# Patient Record
Sex: MALE | Race: WHITE | NOT HISPANIC OR LATINO | Employment: FULL TIME | URBAN - METROPOLITAN AREA
[De-identification: names, ages, dates, MRNs, and addresses within clinical notes are randomized per-mention and may not be internally consistent; named-entity substitution may affect disease eponyms.]

---

## 2020-08-06 ENCOUNTER — TELEPHONE (OUTPATIENT)
Dept: SCHEDULING | Facility: CLINIC | Age: 70
End: 2020-08-06

## 2020-08-06 NOTE — TELEPHONE ENCOUNTER
Patient requesting NP apt with Dr. Orozco at the [facility] location referred by PCP for tingling R hand SOB, and constant fatigue x a few months.    Requesting [facility] location  No recent cardio  No recent hosp visits  No avail apts  Pt can be reached at [phone]

## 2020-08-13 ENCOUNTER — OFFICE VISIT (OUTPATIENT)
Dept: CARDIOLOGY | Facility: CLINIC | Age: 70
End: 2020-08-13
Payer: COMMERCIAL

## 2020-08-13 VITALS
DIASTOLIC BLOOD PRESSURE: 80 MMHG | SYSTOLIC BLOOD PRESSURE: 140 MMHG | WEIGHT: 179 LBS | HEIGHT: 70 IN | BODY MASS INDEX: 25.62 KG/M2 | HEART RATE: 68 BPM | RESPIRATION RATE: 14 BRPM | TEMPERATURE: 98.6 F

## 2020-08-13 DIAGNOSIS — R06.09 DYSPNEA ON EXERTION: ICD-10-CM

## 2020-08-13 DIAGNOSIS — E78.5 DYSLIPIDEMIA: Primary | ICD-10-CM

## 2020-08-13 PROCEDURE — 99204 OFFICE O/P NEW MOD 45 MIN: CPT | Performed by: INTERNAL MEDICINE

## 2020-08-13 PROCEDURE — 93000 ELECTROCARDIOGRAM COMPLETE: CPT | Performed by: INTERNAL MEDICINE

## 2020-08-13 RX ORDER — ATORVASTATIN CALCIUM 20 MG/1
40 TABLET, FILM COATED ORAL
COMMUNITY
Start: 2020-06-03 | End: 2024-12-12 | Stop reason: SDUPTHER

## 2020-08-13 RX ORDER — TAMSULOSIN HYDROCHLORIDE 0.4 MG/1
0.4 CAPSULE ORAL
COMMUNITY
Start: 2020-06-03 | End: 2024-12-12

## 2020-08-13 RX ORDER — FLUTICASONE PROPIONATE AND SALMETEROL 500; 50 UG/1; UG/1
2 POWDER RESPIRATORY (INHALATION) DAILY
COMMUNITY
Start: 2020-07-15

## 2020-08-13 RX ORDER — CYCLOSPORINE 0.5 MG/ML
1 EMULSION OPHTHALMIC 2 TIMES DAILY
COMMUNITY
End: 2023-11-30

## 2020-08-13 ASSESSMENT — ENCOUNTER SYMPTOMS
EYES NEGATIVE: 1
NEUROLOGICAL NEGATIVE: 1
ROS SKIN COMMENTS: ECZEMA
ENDOCRINE NEGATIVE: 1
RESPIRATORY NEGATIVE: 1
HEMATOLOGIC/LYMPHATIC NEGATIVE: 1
PSYCHIATRIC NEGATIVE: 1
MYALGIAS: 1
DYSPNEA ON EXERTION: 1
MUSCLE CRAMPS: 1
BACK PAIN: 1
GASTROINTESTINAL NEGATIVE: 1
WEIGHT GAIN: 1

## 2020-08-13 NOTE — PROGRESS NOTES
Chief Complaint: I saw Dieter as a new patient today he is here to be evaluated for progressive shortness of breath with mild activity.  He has no chest discomfort or chest pressure but is short of breath with exertion, not anxiety, cold weather, postprandially, he denies chest discomfort or shortness of breath with sex at rest or nocturnally.  He cannot climb a flight of stairs without getting short of breath, he denies proximal nocturnal dyspnea, orthopnea, palpitations, syncope, ankle edema he has nocturia.       Medications:  Current Outpatient Medications   Medication Sig Dispense Refill   • atorvastatin (LIPITOR) 20 mg tablet Take 20 mg by mouth once daily.     • cycloSPORINE (RESTASIS) 0.05 % ophthalmic emulsion Administer 1 drop into both eyes 2 (two) times a day.     • tamsulosin (FLOMAX) 0.4 mg capsule Take 0.4 mg by mouth once daily.     • WIXELA INHUB 500-50 mcg/dose diskus inhaler Inhale 2 puffs daily.       No current facility-administered medications for this visit.    Past medical history: Hypercholesterolemia, asthma, dry eyes, obstructing prostatism, eczema,.  Past surgical history umbilical hernia repair.  Social history he smokes marijuana may be 2 to 5 cigarettes a day for the last 10 years alcohol to 3 beers a week drugs are none.  Family history: Mother  of complications of Alzheimer's disease, father  diabetes and a myocardial infarction.  Dietary history: He does not eat beef daily, fried foods daily, does eat grade 3 eggs per week, does not eat cheese daily but drinks whole milk only in his coffee.  He drinks 1 to 2 cups of coffee day no tea no Coke or Pepsi   allergies: are to latex penicillin nuts.  Injuries: None  Review of Systems:  Review of Systems   Constitution: Positive for weight gain.   HENT: Negative.    Eyes: Negative.         Dry eyes   Cardiovascular: Positive for dyspnea on exertion.   Respiratory: Negative.    Endocrine: Negative.    Hematologic/Lymphatic:  Negative.    Skin: Negative.         eczema   Musculoskeletal: Positive for back pain, muscle cramps and myalgias.   Gastrointestinal: Negative.    Genitourinary: Positive for nocturia.   Neurological: Negative.    Psychiatric/Behavioral: Negative.    Allergic/Immunologic: Positive for environmental allergies.       Physical Exam:  Vitals:    08/13/20 1507   BP: 140/80   Pulse: 68   Resp: 14   Temp: 37 °C (98.6 °F)     Physical Exam   Constitutional: He is oriented to person, place, and time. He appears well-developed and well-nourished.   HENT:   Head: Normocephalic and atraumatic.   Eyes: Pupils are equal, round, and reactive to light. Conjunctivae and EOM are normal.   Neck: Normal range of motion. Neck supple.   Cardiovascular: Normal rate, regular rhythm, normal heart sounds and intact distal pulses.   Pulmonary/Chest: Effort normal and breath sounds normal.   Abdominal: Soft. Bowel sounds are normal.   Musculoskeletal: Normal range of motion.   Neurological: He is alert and oriented to person, place, and time. He has normal strength and normal reflexes.   Skin: Skin is warm, dry and intact.   exzema   Psychiatric: He has a normal mood and affect. His speech is normal and behavior is normal. Judgment and thought content normal. Cognition and memory are normal.     EKG: SR early transition V2 with loss R wave height V3-4 ST-tabn  Assessment and Plan:  Impression:  Dyspnea possibly as an anginal equivalent.  Hypercholesterolemia.  Cigarette smoker.  Recommendation: At this point I think he needs a stress echocardiogram to rule out ischemic heart disease as to the etiology of his shortness of breath.  We will also do an echo looking for any right-sided enlargement secondary to his asthma.  We will call him with the results of the study and forward the results to you.  If the studies are negative then he deftly needs to see a pulmonologist.  The studies are positive and certainly will make arrangements to treat his  underlying problems.  Thank you Magali for allowing to see this very nice gentleman in the office today.    Domenico Orozco, DO

## 2020-08-13 NOTE — LETTER
2020     Magali Ibanez DO  [address]  [facility] [address]    Patient: Dieter Sahni  YOB: 1950  Date of Visit: 2020      Dear Dr. Ibanez:    Thank you for referring Dieter Sahni to me for evaluation. Below are my notes for this consultation.    If you have questions, please do not hesitate to call me. I look forward to following your patient along with you.         Sincerely,        Domenico Orozco DO        CC: No Recipients  Domenico Orozco DO  2020  3:45 PM  Signed      Chief Complaint: I saw Dieter as a new patient today he is here to be evaluated for progressive shortness of breath with mild activity.  He has no chest discomfort or chest pressure but is short of breath with exertion, not anxiety, cold weather, postprandially, he denies chest discomfort or shortness of breath with sex at rest or nocturnally.  He cannot climb a flight of stairs without getting short of breath, he denies proximal nocturnal dyspnea, orthopnea, palpitations, syncope, ankle edema he has nocturia.       Medications:  Current Outpatient Medications   Medication Sig Dispense Refill   • atorvastatin (LIPITOR) 20 mg tablet Take 20 mg by mouth once daily.     • cycloSPORINE (RESTASIS) 0.05 % ophthalmic emulsion Administer 1 drop into both eyes 2 (two) times a day.     • tamsulosin (FLOMAX) 0.4 mg capsule Take 0.4 mg by mouth once daily.     • WIXELA INHUB 500-50 mcg/dose diskus inhaler Inhale 2 puffs daily.       No current facility-administered medications for this visit.    Past medical history: Hypercholesterolemia, asthma, dry eyes, obstructing prostatism, eczema,.  Past surgical history umbilical hernia repair.  Social history he smokes marijuana may be 2 to 5 cigarettes a day for the last 10 years alcohol to 3 beers a week drugs are none.  Family history: Mother  of complications of Alzheimer's disease, father  diabetes and a myocardial infarction.  Dietary  history: He does not eat beef daily, fried foods daily, does eat grade 3 eggs per week, does not eat cheese daily but drinks whole milk only in his coffee.  He drinks 1 to 2 cups of coffee day no tea no Coke or Pepsi   allergies: are to latex penicillin nuts.  Injuries: None  Review of Systems:  Review of Systems   Constitution: Positive for weight gain.   HENT: Negative.    Eyes: Negative.         Dry eyes   Cardiovascular: Positive for dyspnea on exertion.   Respiratory: Negative.    Endocrine: Negative.    Hematologic/Lymphatic: Negative.    Skin: Negative.         eczema   Musculoskeletal: Positive for back pain, muscle cramps and myalgias.   Gastrointestinal: Negative.    Genitourinary: Positive for nocturia.   Neurological: Negative.    Psychiatric/Behavioral: Negative.    Allergic/Immunologic: Positive for environmental allergies.       Physical Exam:  Vitals:    08/13/20 1507   BP: 140/80   Pulse: 68   Resp: 14   Temp: 37 °C (98.6 °F)     Physical Exam   Constitutional: He is oriented to person, place, and time. He appears well-developed and well-nourished.   HENT:   Head: Normocephalic and atraumatic.   Eyes: Pupils are equal, round, and reactive to light. Conjunctivae and EOM are normal.   Neck: Normal range of motion. Neck supple.   Cardiovascular: Normal rate, regular rhythm, normal heart sounds and intact distal pulses.   Pulmonary/Chest: Effort normal and breath sounds normal.   Abdominal: Soft. Bowel sounds are normal.   Musculoskeletal: Normal range of motion.   Neurological: He is alert and oriented to person, place, and time. He has normal strength and normal reflexes.   Skin: Skin is warm, dry and intact.   exzema   Psychiatric: He has a normal mood and affect. His speech is normal and behavior is normal. Judgment and thought content normal. Cognition and memory are normal.     EKG: SR early transition V2 with loss R wave height V3-4 ST-tabn  Assessment and Plan:  Impression:  Dyspnea possibly as  an anginal equivalent.  Hypercholesterolemia.  Cigarette smoker.  Recommendation: At this point I think he needs a stress echocardiogram to rule out ischemic heart disease as to the etiology of his shortness of breath.  We will also do an echo looking for any right-sided enlargement secondary to his asthma.  We will call him with the results of the study and forward the results to you.  If the studies are negative then he deftly needs to see a pulmonologist.  The studies are positive and certainly will make arrangements to treat his underlying problems.  Thank you Magali for allowing to see this very nice gentleman in the office today.    Domenico Orozco, DO

## 2020-08-20 ENCOUNTER — APPOINTMENT (OUTPATIENT)
Dept: CARDIOLOGY | Facility: CLINIC | Age: 70
End: 2020-08-20
Attending: INTERNAL MEDICINE
Payer: COMMERCIAL

## 2020-08-20 ENCOUNTER — APPOINTMENT (OUTPATIENT)
Dept: CARDIOLOGY | Facility: CLINIC | Age: 70
Setting detail: NUCLEAR MEDICINE
End: 2020-08-20
Attending: INTERNAL MEDICINE
Payer: COMMERCIAL

## 2020-08-20 ENCOUNTER — HOSPITAL ENCOUNTER (OUTPATIENT)
Dept: CARDIOLOGY | Facility: CLINIC | Age: 70
Setting detail: NUCLEAR MEDICINE
Discharge: HOME | End: 2020-08-20
Attending: INTERNAL MEDICINE
Payer: COMMERCIAL

## 2020-08-20 VITALS
BODY MASS INDEX: 25.62 KG/M2 | HEIGHT: 70 IN | SYSTOLIC BLOOD PRESSURE: 136 MMHG | HEART RATE: 70 BPM | DIASTOLIC BLOOD PRESSURE: 80 MMHG | WEIGHT: 179 LBS

## 2020-08-20 DIAGNOSIS — R06.09 DYSPNEA ON EXERTION: ICD-10-CM

## 2020-08-20 LAB
AORTIC ROOT ANNULUS - M-MODE: 4.5 CM
AORTIC ROOT ANNULUS: 4.4 CM
ASCENDING AORTA: 3.9 CM
AV PEAK GRADIENT: 9 MMHG
AV PEAK VELOCITY-S: 1.53 M/S
BSA FOR ECHO PROCEDURE: 2 M2
CUSP SEPARATION: 2 CM
CV NM TETROFOSMIN REST DOSE: 10.6 MCI
CV NM TETROFOSMIN STRESS DOSE: 32.3 MCI
E WAVE DECELERATION TIME: 239 MS
E/A RATIO: 0.9
E/E' RATIO: 18.6
E/LAT E' RATIO: 17.2
EDV (BP): 82.8 CM3
EF (A4C): 52 %
EF A2C: 66 %
EJECTION FRACTION: 58.5 %
ESV (BP): 34.4 CM3
FRACTIONAL SHORTENING: 6.41 %
INTERVENTRICULAR SEPTUM: 1.04 CM
LA ESV (BP): 43.2 CM3
LA ESV INDEX (A2C): 39.35 CM3/M2
LA ESV INDEX (BP): 21.6 CM3/M2
LA/AORTA RATIO: 0.82
LAAS-AP2: 24.8 CM2
LAAS-AP4: 13.5 CM2
LAD 2D - M-MODE: 3.7 CM
LAD 2D - M-MODE: 3.7 CM
LAD 2D: 3.6 CM
LALD A4C: 6.19 CM
LALD A4C: 6.22 CM
LAV-S: 78.7 CM3
LEFT ATRIUM VOLUME INDEX: 11.85 CM3/M2
LEFT ATRIUM VOLUME: 23.7 CM3
LEFT INTERNAL DIMENSION IN SYSTOLE: 4.38 CM (ref 2.89–4.37)
LEFT VENTRICLE DIASTOLIC VOLUME INDEX: 46.25 CM3/M2
LEFT VENTRICLE DIASTOLIC VOLUME: 92.5 CM3
LEFT VENTRICLE SYSTOLIC VOLUME INDEX: 22.2 CM3/M2
LEFT VENTRICLE SYSTOLIC VOLUME: 44.4 CM3
LEFT VENTRICULAR INTERNAL DIMENSION IN DIASTOLE: 4.68 CM (ref 4.9–6.81)
LEFT VENTRICULAR POSTERIOR WALL IN END DIASTOLE: 1.04 CM (ref 0.64–1.18)
LV DIASTOLIC VOLUME: 71.8 CM3
LV ESV (APICAL 2 CHAMBER): 24.4 CM3
LVAD-AP2: 26 CM2
LVAD-AP4: 30.1 CM2
LVAS-AP2: 13.6 CM2
LVAS-AP4: 19.4 CM2
LVEDVI(A2C): 35.9 CM3/M2
LVEDVI(BP): 41.4 CM3/M2
LVESVI(A2C): 12.2 CM3/M2
LVESVI(BP): 17.2 CM3/M2
LVLD-AP2: 7.92 CM
LVLD-AP4: 8.19 CM
LVLS-AP2: 6.6 CM
LVLS-AP4: 7.21 CM
LVOT 2D: 2.1 CM
LVOT A: 3.46 CM2
MV E'TISSUE VEL-LAT: 0.06 M/S
MV E'TISSUE VEL-MED: 0.06 M/S
MV PEAK A VEL: 1.27 M/S
MV PEAK E VEL: 1.1 M/S
POSTERIOR WALL: 1.04 CM
PULMONARY REGURGITATION LATE DIASTOLIC VELOCITY: 1.22 M/S
PV PEAK GRADIENT: 6 MMHG
PV PV: 1.23 M/S
RVOT VMAX: 0.73 M/S
RVOT VTI: 14.4 CM
SEPTAL TISSUE DOPPLER FREE WALL LATE DIA VELOCITY (APICAL 4 CHAMBER VIEW): 0.28 M/S
STRESS ANGINA INDEX: 0
STRESS BASELINE BP: NORMAL MMHG
STRESS BASELINE HR: 71 BPM
STRESS ECHO POST RECOVERY HR: 100 BPM
STRESS PERCENT HR: 77 %
STRESS POST ESTIMATED WORKLOAD: 10.1 METS
STRESS POST EXERCISE DUR MIN: 7 MIN
STRESS POST EXERCISE DUR SEC: 3 SEC
STRESS POST PEAK BP: NORMAL MMHG
STRESS POST PEAK HR: 116 BPM
STRESS TARGET HR: 128 BPM
Z-SCORE OF LEFT VENTRICULAR DIMENSION IN END DIASTOLE: -2.11
Z-SCORE OF LEFT VENTRICULAR DIMENSION IN END SYSTOLE: 1.66
Z-SCORE OF LEFT VENTRICULAR POSTERIOR WALL IN END DIASTOLE: 0.95

## 2020-08-20 PROCEDURE — 93306 TTE W/DOPPLER COMPLETE: CPT | Performed by: INTERNAL MEDICINE

## 2020-08-20 PROCEDURE — A9502 TC99M TETROFOSMIN: HCPCS | Performed by: INTERNAL MEDICINE

## 2020-08-20 PROCEDURE — 93015 CV STRESS TEST SUPVJ I&R: CPT | Performed by: INTERNAL MEDICINE

## 2020-08-20 PROCEDURE — 78452 HT MUSCLE IMAGE SPECT MULT: CPT | Performed by: INTERNAL MEDICINE

## 2020-08-27 ENCOUNTER — OFFICE VISIT (OUTPATIENT)
Dept: CARDIOLOGY | Facility: CLINIC | Age: 70
End: 2020-08-27
Payer: COMMERCIAL

## 2020-08-27 VITALS
SYSTOLIC BLOOD PRESSURE: 140 MMHG | HEIGHT: 70 IN | HEART RATE: 63 BPM | DIASTOLIC BLOOD PRESSURE: 90 MMHG | RESPIRATION RATE: 16 BRPM | BODY MASS INDEX: 25.45 KG/M2 | OXYGEN SATURATION: 97 % | WEIGHT: 177.8 LBS | TEMPERATURE: 98.6 F

## 2020-08-27 DIAGNOSIS — I71.20 THORACIC AORTIC ANEURYSM WITHOUT RUPTURE (CMS/HCC): Primary | ICD-10-CM

## 2020-08-27 PROCEDURE — 99214 OFFICE O/P EST MOD 30 MIN: CPT | Performed by: INTERNAL MEDICINE

## 2020-08-27 RX ORDER — AMLODIPINE BESYLATE 2.5 MG/1
2.5 TABLET ORAL DAILY
Qty: 90 TABLET | Refills: 1 | Status: SHIPPED | OUTPATIENT
Start: 2020-08-27 | End: 2021-02-18

## 2020-08-27 ASSESSMENT — ENCOUNTER SYMPTOMS
BACK PAIN: 1
WEIGHT LOSS: 1
ENDOCRINE NEGATIVE: 1
NUMBNESS: 1
PSYCHIATRIC NEGATIVE: 1
SHORTNESS OF BREATH: 1
EYES NEGATIVE: 1
CARDIOVASCULAR NEGATIVE: 1
GASTROINTESTINAL NEGATIVE: 1
HEMATOLOGIC/LYMPHATIC NEGATIVE: 1
ROS SKIN COMMENTS: ECZEMA

## 2020-08-27 NOTE — PROGRESS NOTES
Chief Complaint: I saw Dieter in the office today in follow-up after his echocardiogram and nuclear stress test last week.  His nuclear stress test was normal his echo demonstrated that his aortic root may be dilated and there may be an aneurysm there.    He is still short of breath but not as much as previously with exertion not anxiety, he is short of breath in cold weather, not postprandially at rest or nocturnally.  Does get short of breath climbing stairs, he denies proximal nocturnal dyspnea, orthopnea, palpitations, syncope, ankle edema he has nocturia.       Medications:  Current Outpatient Medications   Medication Sig Dispense Refill   • atorvastatin (LIPITOR) 20 mg tablet Take 20 mg by mouth once daily.     • cholecalciferol, vitamin D3, 100 mcg (4,000 unit) tablet Take 4,000 Units by mouth daily.     • cycloSPORINE (RESTASIS) 0.05 % ophthalmic emulsion Administer 1 drop into both eyes 2 (two) times a day.     • multivitamin tablet Take by mouth daily.     • tamsulosin (FLOMAX) 0.4 mg capsule Take 0.4 mg by mouth once daily.     • WIXELA INHUB 500-50 mcg/dose diskus inhaler Inhale 2 puffs daily.       No current facility-administered medications for this visit.        Review of Systems:  Review of Systems   Constitution: Positive for weight loss.   HENT: Negative.    Eyes: Negative.    Cardiovascular: Negative.    Respiratory: Positive for shortness of breath.    Endocrine: Negative.    Hematologic/Lymphatic: Negative.    Skin: Positive for rash.        eczema   Musculoskeletal: Positive for back pain.   Gastrointestinal: Negative.    Genitourinary: Positive for nocturia.   Neurological: Positive for numbness.   Psychiatric/Behavioral: Negative.    Allergic/Immunologic: Positive for environmental allergies.       Physical Exam:  Vitals:    08/27/20 1020   BP: 140/90   Pulse: 63   Resp: 16   Temp: 37 °C (98.6 °F)   SpO2: 97%     Physical Exam   Constitutional: He is oriented to person, place, and time.  He appears well-developed and well-nourished.   HENT:   Head: Normocephalic and atraumatic.   Eyes: Pupils are equal, round, and reactive to light. Conjunctivae and EOM are normal.   Neck: Normal range of motion. Neck supple.   Cardiovascular: Normal rate, regular rhythm, normal heart sounds and intact distal pulses.   Pulmonary/Chest: Effort normal and breath sounds normal.   Abdominal: Soft. Bowel sounds are normal.   Musculoskeletal: Normal range of motion.   Neurological: He is alert and oriented to person, place, and time. He has normal strength and normal reflexes.   Skin: Skin is warm, dry and intact.   Psychiatric: He has a normal mood and affect. His speech is normal and behavior is normal. Judgment and thought content normal. Cognition and memory are normal.     EKG:  Not done  Assessment and Plan:  Impression:  Dyspnea etiology probably from his history of cigarette smoking.  Hypertension.  Rule out thoracic aortic aneurysm.  Obstructing prostatism.  Recommendation:  I started him on amlodipine 2.5 mg once a day to control his blood pressure, I asked him to see you in 6 weeks time for a blood pressure check.  I ordered a CT of his chest to rule out thoracic aortic aneurysm we will forward those results to you and call him with results.  I believe his shortness of breath is more due to his lung disease so I am referring him to either of the pulmonologist at [facility] for an evaluation.  We will see him again in 3 months time, if there is a problem we will see him sooner.  Thank you for the opportunity seeing such nice gentleman again in the office today.      Domenico Orozco DO

## 2020-08-27 NOTE — LETTER
August 27, 2020     Magali Ibanez DO  [address]  [place] [facility] [address]    Patient: Dieter Sahni  YOB: 1950  Date of Visit: 8/27/2020      Dear Dr. Ibanez:    Thank you for referring Dieter Sahni to me for evaluation. Below are my notes for this consultation.    If you have questions, please do not hesitate to call me. I look forward to following your patient along with you.         Sincerely,        Domenico Orozco DO        CC: No Recipients  Domenico Orozco DO  8/27/2020 12:26 PM  Signed      Chief Complaint: I saw Dieter in the office today in follow-up after his echocardiogram and nuclear stress test last week.  His nuclear stress test was normal his echo demonstrated that his aortic root may be dilated and there may be an aneurysm there.    He is still short of breath but not as much as previously with exertion not anxiety, he is short of breath in cold weather, not postprandially at rest or nocturnally.  Does get short of breath climbing stairs, he denies proximal nocturnal dyspnea, orthopnea, palpitations, syncope, ankle edema he has nocturia.       Medications:  Current Outpatient Medications   Medication Sig Dispense Refill   • atorvastatin (LIPITOR) 20 mg tablet Take 20 mg by mouth once daily.     • cholecalciferol, vitamin D3, 100 mcg (4,000 unit) tablet Take 4,000 Units by mouth daily.     • cycloSPORINE (RESTASIS) 0.05 % ophthalmic emulsion Administer 1 drop into both eyes 2 (two) times a day.     • multivitamin tablet Take by mouth daily.     • tamsulosin (FLOMAX) 0.4 mg capsule Take 0.4 mg by mouth once daily.     • WIXELA INHUB 500-50 mcg/dose diskus inhaler Inhale 2 puffs daily.       No current facility-administered medications for this visit.        Review of Systems:  Review of Systems   Constitution: Positive for weight loss.   HENT: Negative.    Eyes: Negative.    Cardiovascular: Negative.    Respiratory: Positive for shortness of breath.     Endocrine: Negative.    Hematologic/Lymphatic: Negative.    Skin: Positive for rash.        eczema   Musculoskeletal: Positive for back pain.   Gastrointestinal: Negative.    Genitourinary: Positive for nocturia.   Neurological: Positive for numbness.   Psychiatric/Behavioral: Negative.    Allergic/Immunologic: Positive for environmental allergies.       Physical Exam:  Vitals:    08/27/20 1020   BP: 140/90   Pulse: 63   Resp: 16   Temp: 37 °C (98.6 °F)   SpO2: 97%     Physical Exam   Constitutional: He is oriented to person, place, and time. He appears well-developed and well-nourished.   HENT:   Head: Normocephalic and atraumatic.   Eyes: Pupils are equal, round, and reactive to light. Conjunctivae and EOM are normal.   Neck: Normal range of motion. Neck supple.   Cardiovascular: Normal rate, regular rhythm, normal heart sounds and intact distal pulses.   Pulmonary/Chest: Effort normal and breath sounds normal.   Abdominal: Soft. Bowel sounds are normal.   Musculoskeletal: Normal range of motion.   Neurological: He is alert and oriented to person, place, and time. He has normal strength and normal reflexes.   Skin: Skin is warm, dry and intact.   Psychiatric: He has a normal mood and affect. His speech is normal and behavior is normal. Judgment and thought content normal. Cognition and memory are normal.     EKG:  Not done  Assessment and Plan:  Impression:  Dyspnea etiology probably from his history of cigarette smoking.  Hypertension.  Rule out thoracic aortic aneurysm.  Obstructing prostatism.  Recommendation:  I started him on amlodipine 2.5 mg once a day to control his blood pressure, I asked him to see you in 6 weeks time for a blood pressure check.  I ordered a CT of his chest to rule out thoracic aortic aneurysm we will forward those results to you and call him with results.  I believe his shortness of breath is more due to his lung disease so I am referring him to either of the pulmonologist at [facility] for  an evaluation.  We will see him again in 3 months time, if there is a problem we will see him sooner.  Thank you for the opportunity seeing such nice gentleman again in the office today.      Domenico Orozco, DO

## 2020-09-09 LAB
BUN SERPL-MCNC: 17 MG/DL (ref 8–27)
BUN/CREAT SERPL: 18 (ref 10–24)
CALCIUM SERPL-MCNC: 9.5 MG/DL (ref 8.6–10.2)
CHLORIDE SERPL-SCNC: 104 MMOL/L (ref 96–106)
CO2 SERPL-SCNC: 26 MMOL/L (ref 20–29)
CREAT SERPL-MCNC: 0.92 MG/DL (ref 0.76–1.27)
GLUCOSE SERPL-MCNC: 96 MG/DL (ref 65–99)
LAB CORP EGFR IF AFRICN AM: 97 ML/MIN/1.73
LAB CORP EGFR IF NONAFRICN AM: 84 ML/MIN/1.73
POTASSIUM SERPL-SCNC: 4.5 MMOL/L (ref 3.5–5.2)
SODIUM SERPL-SCNC: 141 MMOL/L (ref 134–144)

## 2020-09-16 DIAGNOSIS — I71.20 THORACIC AORTIC ANEURYSM WITHOUT RUPTURE (CMS/HCC): ICD-10-CM

## 2021-02-18 RX ORDER — AMLODIPINE BESYLATE 2.5 MG/1
TABLET ORAL
Qty: 90 TABLET | Refills: 1 | Status: SHIPPED | OUTPATIENT
Start: 2021-02-18 | End: 2021-08-17 | Stop reason: SDUPTHER

## 2021-03-11 ENCOUNTER — OFFICE VISIT (OUTPATIENT)
Dept: CARDIOLOGY | Facility: CLINIC | Age: 71
End: 2021-03-11
Payer: COMMERCIAL

## 2021-03-11 VITALS
BODY MASS INDEX: 25.25 KG/M2 | DIASTOLIC BLOOD PRESSURE: 70 MMHG | HEIGHT: 70 IN | HEART RATE: 66 BPM | SYSTOLIC BLOOD PRESSURE: 120 MMHG | RESPIRATION RATE: 16 BRPM | WEIGHT: 176.4 LBS

## 2021-03-11 DIAGNOSIS — E78.5 DYSLIPIDEMIA: ICD-10-CM

## 2021-03-11 DIAGNOSIS — I71.20 THORACIC AORTIC ANEURYSM WITHOUT RUPTURE (CMS/HCC): Primary | ICD-10-CM

## 2021-03-11 DIAGNOSIS — R06.09 DYSPNEA ON EXERTION: ICD-10-CM

## 2021-03-11 PROCEDURE — 93000 ELECTROCARDIOGRAM COMPLETE: CPT | Performed by: INTERNAL MEDICINE

## 2021-03-11 PROCEDURE — 99214 OFFICE O/P EST MOD 30 MIN: CPT | Performed by: INTERNAL MEDICINE

## 2021-03-11 RX ORDER — MONTELUKAST SODIUM 10 MG/1
10 TABLET ORAL
COMMUNITY
Start: 2021-02-14 | End: 2022-12-22

## 2021-03-11 ASSESSMENT — ENCOUNTER SYMPTOMS
SHORTNESS OF BREATH: 1
BACK PAIN: 1
DYSPNEA ON EXERTION: 1
CONSTIPATION: 1
DEPRESSION: 1
HEMATOLOGIC/LYMPHATIC NEGATIVE: 1
EYES NEGATIVE: 1
ROS SKIN COMMENTS: ECZEMA
NUMBNESS: 1

## 2021-03-11 NOTE — LETTER
March 11, 2021     Magali Ibanez DO  [address]  [place] [facility] [facility] [address]    Patient: Dieter Sahni  YOB: 1950  Date of Visit: 3/11/2021      Dear Dr. Ibanez:    Thank you for referring Dieter Sahni to me for evaluation. Below are my notes for this consultation.    If you have questions, please do not hesitate to call me. I look forward to following your patient along with you.         Sincerely,        Domenico Orozco DO        CC: No Recipients  Domenico Orozco DO  3/11/2021  2:52 PM  Signed      Chief Complaint: I saw Mr. Sahni in the office today on a routine visit for his hypertension and shortness of breath.  He denies any chest discomfort or shortness of breath with exertion, anxiety, cold weather, postprandially, with sex, at rest, or nocturnally.  He denies exertional dyspnea, proximal nocturnal dyspnea, orthopnea, palpitations, syncope, ankle edema, he has nocturia.       Medications:  Current Outpatient Medications   Medication Sig Dispense Refill   • amLODIPine (NORVASC) 2.5 mg tablet take 1 tablet by mouth once daily 90 tablet 1   • atorvastatin (LIPITOR) 20 mg tablet Take 20 mg by mouth once daily.     • cholecalciferol, vitamin D3, 100 mcg (4,000 unit) tablet Take 4,000 Units by mouth daily.     • cycloSPORINE (RESTASIS) 0.05 % ophthalmic emulsion Administer 1 drop into both eyes 2 (two) times a day.     • montelukast (SINGULAIR) 10 mg tablet Take 10 mg by mouth once daily.     • multivitamin tablet Take by mouth daily.     • tamsulosin (FLOMAX) 0.4 mg capsule Take 0.4 mg by mouth once daily.     • WIXELA INHUB 500-50 mcg/dose diskus inhaler Inhale 2 puffs daily.       No current facility-administered medications for this visit.        Review of Systems:  Review of Systems   HENT: Negative.    Eyes: Negative.    Cardiovascular: Positive for dyspnea on exertion.   Respiratory: Positive for shortness of breath.    Endocrine: Positive for cold intolerance.    Hematologic/Lymphatic: Negative.    Skin: Positive for rash.        eczema   Musculoskeletal: Positive for back pain.   Gastrointestinal: Positive for constipation.   Neurological: Positive for numbness.   Psychiatric/Behavioral: Positive for depression.   Allergic/Immunologic: Positive for environmental allergies.       Physical Exam:  Vitals:    03/11/21 1413   BP: 120/70   Pulse: 66   Resp: 16     Physical Exam   Constitutional: He is oriented to person, place, and time. He appears well-developed and well-nourished.   HENT:   Head: Normocephalic and atraumatic.   Eyes: Pupils are equal, round, and reactive to light. Conjunctivae and EOM are normal.   Neck: Normal range of motion. Neck supple.   Cardiovascular: Normal rate, regular rhythm, normal heart sounds and intact distal pulses.   Pulmonary/Chest: Effort normal and breath sounds normal.   Abdominal: Soft. Bowel sounds are normal.   Musculoskeletal: Normal range of motion.   Neurological: He is alert and oriented to person, place, and time. He has normal strength and normal reflexes.   Skin: Skin is warm, dry and intact.   Psychiatric: He has a normal mood and affect. His speech is normal and behavior is normal. Judgment and thought content normal. Cognition and memory are normal.     EKG: SR early transiton V2 ST-tabn no change  Assessment and Plan:  Impression:  Hypertension.  Top normal thoracic aortic size.  Asthma.  Recommendation:  He is hemodynamically stable I did not make any changes in his medications today.  Please forward to start his latest lipid profile and hypersensitive CRP for our records.  We will see him again in 6 months time, if there is a problem we will see him sooner.  Thank you for allowing us to see this very nice gentleman in the office today.    Domenico Orozco,

## 2021-03-11 NOTE — PROGRESS NOTES
Chief Complaint: I saw Mr. Sahni in the office today on a routine visit for his hypertension and shortness of breath.  He denies any chest discomfort or shortness of breath with exertion, anxiety, cold weather, postprandially, with sex, at rest, or nocturnally.  He denies exertional dyspnea, proximal nocturnal dyspnea, orthopnea, palpitations, syncope, ankle edema, he has nocturia.       Medications:  Current Outpatient Medications   Medication Sig Dispense Refill   • amLODIPine (NORVASC) 2.5 mg tablet take 1 tablet by mouth once daily 90 tablet 1   • atorvastatin (LIPITOR) 20 mg tablet Take 20 mg by mouth once daily.     • cholecalciferol, vitamin D3, 100 mcg (4,000 unit) tablet Take 4,000 Units by mouth daily.     • cycloSPORINE (RESTASIS) 0.05 % ophthalmic emulsion Administer 1 drop into both eyes 2 (two) times a day.     • montelukast (SINGULAIR) 10 mg tablet Take 10 mg by mouth once daily.     • multivitamin tablet Take by mouth daily.     • tamsulosin (FLOMAX) 0.4 mg capsule Take 0.4 mg by mouth once daily.     • WIXELA INHUB 500-50 mcg/dose diskus inhaler Inhale 2 puffs daily.       No current facility-administered medications for this visit.        Review of Systems:  Review of Systems   HENT: Negative.    Eyes: Negative.    Cardiovascular: Positive for dyspnea on exertion.   Respiratory: Positive for shortness of breath.    Endocrine: Positive for cold intolerance.   Hematologic/Lymphatic: Negative.    Skin: Positive for rash.        eczema   Musculoskeletal: Positive for back pain.   Gastrointestinal: Positive for constipation.   Neurological: Positive for numbness.   Psychiatric/Behavioral: Positive for depression.   Allergic/Immunologic: Positive for environmental allergies.       Physical Exam:  Vitals:    03/11/21 1413   BP: 120/70   Pulse: 66   Resp: 16     Physical Exam   Constitutional: He is oriented to person, place, and time. He appears well-developed and well-nourished.   HENT:   Head:  Normocephalic and atraumatic.   Eyes: Pupils are equal, round, and reactive to light. Conjunctivae and EOM are normal.   Neck: Normal range of motion. Neck supple.   Cardiovascular: Normal rate, regular rhythm, normal heart sounds and intact distal pulses.   Pulmonary/Chest: Effort normal and breath sounds normal.   Abdominal: Soft. Bowel sounds are normal.   Musculoskeletal: Normal range of motion.   Neurological: He is alert and oriented to person, place, and time. He has normal strength and normal reflexes.   Skin: Skin is warm, dry and intact.   Psychiatric: He has a normal mood and affect. His speech is normal and behavior is normal. Judgment and thought content normal. Cognition and memory are normal.     EKG: SR early transiton V2 ST-tabn no change  Assessment and Plan:  Impression:  Hypertension.  Top normal thoracic aortic size.  Asthma.  Recommendation:  He is hemodynamically stable I did not make any changes in his medications today.  Please forward to start his latest lipid profile and hypersensitive CRP for our records.  We will see him again in 6 months time, if there is a problem we will see him sooner.  Thank you for allowing us to see this very nice gentleman in the office today.    Domenico Orozco, DO

## 2021-03-12 ENCOUNTER — TELEPHONE (OUTPATIENT)
Dept: SCHEDULING | Facility: CLINIC | Age: 71
End: 2021-03-12

## 2021-03-12 NOTE — TELEPHONE ENCOUNTER
Pt seen seen in Office yesterday and needs a return to work sent to his employer    Per pt is at work now and in order to stay he needs note stating ok for him to work    Pt states he works at [employer].    F# [phone]      Pt #[phone]

## 2021-03-21 ENCOUNTER — IMMUNIZATION (OUTPATIENT)
Dept: IMMUNIZATION | Facility: CLINIC | Age: 71
End: 2021-03-21

## 2021-04-13 ENCOUNTER — IMMUNIZATION (OUTPATIENT)
Dept: IMMUNIZATION | Facility: CLINIC | Age: 71
End: 2021-04-13

## 2021-08-17 ENCOUNTER — TELEPHONE (OUTPATIENT)
Dept: CARDIOLOGY | Facility: CLINIC | Age: 71
End: 2021-08-17

## 2021-08-17 RX ORDER — AMLODIPINE BESYLATE 2.5 MG/1
2.5 TABLET ORAL
Qty: 90 TABLET | Refills: 1 | Status: SHIPPED | OUTPATIENT
Start: 2021-08-17 | End: 2022-02-14

## 2021-09-09 ENCOUNTER — OFFICE VISIT (OUTPATIENT)
Dept: CARDIOLOGY | Facility: CLINIC | Age: 71
End: 2021-09-09
Payer: COMMERCIAL

## 2021-09-09 VITALS
RESPIRATION RATE: 16 BRPM | DIASTOLIC BLOOD PRESSURE: 80 MMHG | HEIGHT: 70 IN | WEIGHT: 180.2 LBS | HEART RATE: 63 BPM | SYSTOLIC BLOOD PRESSURE: 130 MMHG | BODY MASS INDEX: 25.8 KG/M2

## 2021-09-09 DIAGNOSIS — I35.0 NONRHEUMATIC AORTIC VALVE STENOSIS: ICD-10-CM

## 2021-09-09 DIAGNOSIS — E78.5 DYSLIPIDEMIA: Primary | ICD-10-CM

## 2021-09-09 PROCEDURE — 3008F BODY MASS INDEX DOCD: CPT | Performed by: INTERNAL MEDICINE

## 2021-09-09 PROCEDURE — 93000 ELECTROCARDIOGRAM COMPLETE: CPT | Performed by: INTERNAL MEDICINE

## 2021-09-09 PROCEDURE — 99214 OFFICE O/P EST MOD 30 MIN: CPT | Performed by: INTERNAL MEDICINE

## 2021-09-09 ASSESSMENT — ENCOUNTER SYMPTOMS
NEUROLOGICAL NEGATIVE: 1
PSYCHIATRIC NEGATIVE: 1
CARDIOVASCULAR NEGATIVE: 1
GASTROINTESTINAL NEGATIVE: 1
EYES NEGATIVE: 1
ROS SKIN COMMENTS: ECZEMA
HEMATOLOGIC/LYMPHATIC NEGATIVE: 1
RESPIRATORY NEGATIVE: 1
CONSTITUTIONAL NEGATIVE: 1
BACK PAIN: 1

## 2021-09-09 NOTE — PROGRESS NOTES
Chief Complaint: I saw Satish in the office today on a routine visit for his hypertension and aortic stenosis.  He is hemodynamically stable and denies any form of chest discomfort or shortness of breath with exertion, anxiety, cold weather, postprandially, with sex, at rest, or nocturnally.  He denies exertional dyspnea, proximal nocturnal dyspnea, orthopnea, palpitations, syncope, ankle edema, he has nocturia.       Medications:  Current Outpatient Medications   Medication Sig Dispense Refill   • amLODIPine (NORVASC) 2.5 mg tablet Take 1 tablet (2.5 mg total) by mouth once daily. 90 tablet 1   • atorvastatin (LIPITOR) 20 mg tablet Take 20 mg by mouth once daily.     • cholecalciferol, vitamin D3, 100 mcg (4,000 unit) tablet Take 4,000 Units by mouth daily.     • cycloSPORINE (RESTASIS) 0.05 % ophthalmic emulsion Administer 1 drop into both eyes 2 (two) times a day.     • montelukast (SINGULAIR) 10 mg tablet Take 10 mg by mouth once daily.     • multivitamin tablet Take by mouth daily.     • tamsulosin (FLOMAX) 0.4 mg capsule Take 0.4 mg by mouth once daily.     • WIXELA INHUB 500-50 mcg/dose diskus inhaler Inhale 2 puffs daily.       No current facility-administered medications for this visit.       Review of Systems:  Review of Systems   Constitutional: Negative.   HENT: Negative.    Eyes: Negative.         Eye infection   Cardiovascular: Negative.    Respiratory: Negative.    Endocrine: Positive for cold intolerance.   Hematologic/Lymphatic: Negative.    Skin: Negative.         eczema   Musculoskeletal: Positive for back pain.   Gastrointestinal: Negative.    Genitourinary: Positive for nocturia.   Neurological: Negative.    Psychiatric/Behavioral: Negative.    Allergic/Immunologic: Positive for environmental allergies.       Physical Exam:  Vitals:    09/09/21 1426   BP: 130/80   Pulse: 63   Resp: 16     Physical Exam  Constitutional:       Appearance: He is well-developed.   HENT:      Head: Normocephalic  and atraumatic.   Eyes:      Conjunctiva/sclera: Conjunctivae normal.      Pupils: Pupils are equal, round, and reactive to light.   Cardiovascular:      Rate and Rhythm: Normal rate and regular rhythm.      Pulses: Intact distal pulses.      Heart sounds: Gallop present.    Pulmonary:      Effort: Pulmonary effort is normal.      Breath sounds: Normal breath sounds.   Abdominal:      General: Bowel sounds are normal.      Palpations: Abdomen is soft.   Musculoskeletal:         General: Normal range of motion.      Cervical back: Normal range of motion and neck supple.   Skin:     General: Skin is warm and dry.   Neurological:      Mental Status: He is alert and oriented to person, place, and time.      Deep Tendon Reflexes: Reflexes are normal and symmetric.   Psychiatric:         Speech: Speech normal.         Behavior: Behavior normal.         Thought Content: Thought content normal.         Judgment: Judgment normal.       EKG: SR WNL    Assessment and Plan:  Impression:  Hypertension controlled.  Aortic stenosis.  Hyperlipidemia.  Obstructive prostatism.  Recommendation: He appears to be hemodynamically stable I did not make any changes in his medications.  I counseled him on diet and exercise.  We will see him again in 6 months time, if there is a problem we will see him sooner we will do a repeat echo to follow his aortic stenosis at that time.    Domenico Orozco, DO

## 2021-09-09 NOTE — LETTER
September 9, 2021     Magali Ibanez DO  [address]  [place] [address]    Patient: Dieter Sahni  YOB: 1950  Date of Visit: 9/9/2021      Dear Dr. Ibanez:    Thank you for referring Dieter Sahni to me for evaluation. Below are my notes for this consultation.    If you have questions, please do not hesitate to call me. I look forward to following your patient along with you.         Sincerely,        Domenico Orozco DO        CC: No Recipients  Domenico Orozco DO  9/9/2021  3:14 PM  Signed      Chief Complaint: I saw Satish in the office today on a routine visit for his hypertension and aortic stenosis.  He is hemodynamically stable and denies any form of chest discomfort or shortness of breath with exertion, anxiety, cold weather, postprandially, with sex, at rest, or nocturnally.  He denies exertional dyspnea, proximal nocturnal dyspnea, orthopnea, palpitations, syncope, ankle edema, he has nocturia.       Medications:  Current Outpatient Medications   Medication Sig Dispense Refill   • amLODIPine (NORVASC) 2.5 mg tablet Take 1 tablet (2.5 mg total) by mouth once daily. 90 tablet 1   • atorvastatin (LIPITOR) 20 mg tablet Take 20 mg by mouth once daily.     • cholecalciferol, vitamin D3, 100 mcg (4,000 unit) tablet Take 4,000 Units by mouth daily.     • cycloSPORINE (RESTASIS) 0.05 % ophthalmic emulsion Administer 1 drop into both eyes 2 (two) times a day.     • montelukast (SINGULAIR) 10 mg tablet Take 10 mg by mouth once daily.     • multivitamin tablet Take by mouth daily.     • tamsulosin (FLOMAX) 0.4 mg capsule Take 0.4 mg by mouth once daily.     • WIXELA INHUB 500-50 mcg/dose diskus inhaler Inhale 2 puffs daily.       No current facility-administered medications for this visit.       Review of Systems:  Review of Systems   Constitutional: Negative.   HENT: Negative.    Eyes: Negative.         Eye infection   Cardiovascular: Negative.    Respiratory: Negative.     Endocrine: Positive for cold intolerance.   Hematologic/Lymphatic: Negative.    Skin: Negative.         eczema   Musculoskeletal: Positive for back pain.   Gastrointestinal: Negative.    Genitourinary: Positive for nocturia.   Neurological: Negative.    Psychiatric/Behavioral: Negative.    Allergic/Immunologic: Positive for environmental allergies.       Physical Exam:  Vitals:    09/09/21 1426   BP: 130/80   Pulse: 63   Resp: 16     Physical Exam  Constitutional:       Appearance: He is well-developed.   HENT:      Head: Normocephalic and atraumatic.   Eyes:      Conjunctiva/sclera: Conjunctivae normal.      Pupils: Pupils are equal, round, and reactive to light.   Cardiovascular:      Rate and Rhythm: Normal rate and regular rhythm.      Pulses: Intact distal pulses.      Heart sounds: Gallop present.    Pulmonary:      Effort: Pulmonary effort is normal.      Breath sounds: Normal breath sounds.   Abdominal:      General: Bowel sounds are normal.      Palpations: Abdomen is soft.   Musculoskeletal:         General: Normal range of motion.      Cervical back: Normal range of motion and neck supple.   Skin:     General: Skin is warm and dry.   Neurological:      Mental Status: He is alert and oriented to person, place, and time.      Deep Tendon Reflexes: Reflexes are normal and symmetric.   Psychiatric:         Speech: Speech normal.         Behavior: Behavior normal.         Thought Content: Thought content normal.         Judgment: Judgment normal.       EKG: SR WNL    Assessment and Plan:  Impression:  Hypertension controlled.  Aortic stenosis.  Hyperlipidemia.  Obstructive prostatism.  Recommendation: He appears to be hemodynamically stable I did not make any changes in his medications.  I counseled him on diet and exercise.  We will see him again in 6 months time, if there is a problem we will see him sooner we will do a repeat echo to follow his aortic stenosis at that time.    Dmoenico Orozco, DO

## 2021-10-06 ENCOUNTER — IMMUNIZATION (OUTPATIENT)
Dept: IMMUNIZATION | Facility: CLINIC | Age: 71
End: 2021-10-06
Payer: COMMERCIAL

## 2021-10-06 PROCEDURE — 0003A COVID-19 (SARS-COV-2) VACCINE, PFIZER: CPT | Performed by: FAMILY MEDICINE

## 2021-10-06 PROCEDURE — 91300 COVID-19 (SARS-COV-2) VACCINE, PFIZER: CPT | Performed by: FAMILY MEDICINE

## 2022-02-14 ENCOUNTER — TELEPHONE (OUTPATIENT)
Dept: CARDIOLOGY | Facility: CLINIC | Age: 72
End: 2022-02-14
Payer: COMMERCIAL

## 2022-02-14 RX ORDER — AMLODIPINE BESYLATE 2.5 MG/1
TABLET ORAL
Qty: 90 TABLET | Refills: 3 | Status: SHIPPED | OUTPATIENT
Start: 2022-02-14 | End: 2022-04-22 | Stop reason: SDUPTHER

## 2022-03-18 ENCOUNTER — TELEPHONE (OUTPATIENT)
Dept: CARDIOLOGY | Facility: CLINIC | Age: 72
End: 2022-03-18
Payer: COMMERCIAL

## 2022-03-18 NOTE — TELEPHONE ENCOUNTER
Radha Garcias is scheduled for an ECHO in the [facility] office on 3-    Insurance is [facility] ID # [id number]   1950    Will need precert  x

## 2022-03-31 ENCOUNTER — TELEPHONE (OUTPATIENT)
Dept: CARDIOLOGY | Facility: CLINIC | Age: 72
End: 2022-03-31
Payer: COMMERCIAL

## 2022-03-31 ENCOUNTER — OFFICE VISIT (OUTPATIENT)
Dept: CARDIOLOGY | Facility: CLINIC | Age: 72
End: 2022-03-31
Payer: COMMERCIAL

## 2022-03-31 ENCOUNTER — HOSPITAL ENCOUNTER (OUTPATIENT)
Dept: CARDIOLOGY | Facility: CLINIC | Age: 72
Discharge: HOME | End: 2022-03-31
Attending: INTERNAL MEDICINE
Payer: COMMERCIAL

## 2022-03-31 VITALS
HEIGHT: 70 IN | WEIGHT: 175.4 LBS | DIASTOLIC BLOOD PRESSURE: 70 MMHG | TEMPERATURE: 98.4 F | RESPIRATION RATE: 16 BRPM | HEART RATE: 65 BPM | SYSTOLIC BLOOD PRESSURE: 122 MMHG | BODY MASS INDEX: 25.11 KG/M2

## 2022-03-31 VITALS
BODY MASS INDEX: 25.05 KG/M2 | DIASTOLIC BLOOD PRESSURE: 60 MMHG | SYSTOLIC BLOOD PRESSURE: 134 MMHG | WEIGHT: 175 LBS | HEIGHT: 70 IN

## 2022-03-31 DIAGNOSIS — I35.1 NONRHEUMATIC AORTIC VALVE INSUFFICIENCY: ICD-10-CM

## 2022-03-31 DIAGNOSIS — I35.0 NONRHEUMATIC AORTIC VALVE STENOSIS: Primary | ICD-10-CM

## 2022-03-31 LAB
AORTIC ROOT ANNULUS - M-MODE: 4.4 CM
AORTIC ROOT ANNULUS: 4.4 CM
ASCENDING AORTA: 3.7 CM
AV PEAK GRADIENT: 11 MMHG
AV PEAK VELOCITY-S: 1.63 M/S
AV VALVE AREA: 2.73 CM2
BSA FOR ECHO PROCEDURE: 1.98 M2
CUSP SEPARATION: 2.7 CM
DOP CALC LVOT STROKE VOLUME: 109.8 ML
E WAVE DECELERATION TIME: 257 MS
E/A RATIO: 1
E/E' RATIO: 21.2
E/LAT E' RATIO: 18.9
EDV (BP): 88 CM3
EF (A4C): 69.9 %
EF A2C: 52.6 %
EJECTION FRACTION: 62.7 %
ESV (BP): 32.8 CM3
FRACTIONAL SHORTENING: 22.4 %
INTERVENTRICULAR SEPTUM: 1.04 CM
LA ESV (BP): 55.1 CM3
LA ESV INDEX (A2C): 27.37 CM3/M2
LA ESV INDEX (BP): 27.83 CM3/M2
LA/AORTA RATIO: 0.96
LAAS-AP2: 19.8 CM2
LAAS-AP4: 21 CM2
LAD 2D - M-MODE: 3.1 CM
LAD 2D - M-MODE: 3.1 CM
LAD 2D: 4.2 CM
LALD A4C: 6.05 CM
LALD A4C: 7 CM
LAV-S: 54.2 CM3
LEFT ATRIUM VOLUME INDEX: 24.8 CM3/M2
LEFT ATRIUM VOLUME: 49.1 CM3
LEFT INTERNAL DIMENSION IN SYSTOLE: 3.7 CM (ref 2.83–4.28)
LEFT VENTRICLE DIASTOLIC VOLUME INDEX: 44.19 CM3/M2
LEFT VENTRICLE DIASTOLIC VOLUME: 87.5 CM3
LEFT VENTRICLE SYSTOLIC VOLUME INDEX: 13.28 CM3/M2
LEFT VENTRICLE SYSTOLIC VOLUME: 26.3 CM3
LEFT VENTRICULAR INTERNAL DIMENSION IN DIASTOLE: 4.77 CM (ref 4.81–6.67)
LEFT VENTRICULAR POSTERIOR WALL IN END DIASTOLE: 1.16 CM (ref 0.62–1.16)
LV DIASTOLIC VOLUME: 83.8 CM3
LV ESV (APICAL 2 CHAMBER): 39.7 CM3
LVAD-AP2: 26.7 CM2
LVAD-AP4: 27.9 CM2
LVAS-AP2: 17.1 CM2
LVAS-AP4: 14 CM2
LVEDVI(A2C): 42.32 CM3/M2
LVEDVI(BP): 44.44 CM3/M2
LVESVI(A2C): 20.05 CM3/M2
LVESVI(BP): 16.57 CM3/M2
LVLD-AP2: 7.75 CM
LVLD-AP4: 8.33 CM
LVLS-AP2: 6.57 CM
LVLS-AP4: 6.82 CM
LVOT 2D: 2.2 CM
LVOT A: 3.8 CM2
LVOT MG: 3 MMHG
LVOT MV: 0.8 M/S
LVOT PEAK VELOCITY: 1.17 M/S
LVOT VTI: 28.9 CM
MV E'TISSUE VEL-LAT: 0.05 M/S
MV E'TISSUE VEL-MED: 0.05 M/S
MV PEAK A VEL: 1 M/S
MV PEAK E VEL: 1.02 M/S
POSTERIOR WALL: 1.16 CM
PULMONARY REGURGITATION LATE DIASTOLIC VELOCITY: 1.27 M/S
PV PEAK GRADIENT: 5 MMHG
PV PV: 1.15 M/S
RVOT VMAX: 0.56 M/S
RVOT VTI: 11.6 CM
SEPTAL TISSUE DOPPLER FREE WALL LATE DIA VELOCITY (APICAL 4 CHAMBER VIEW): 0.28 M/S
Z-SCORE OF LEFT VENTRICULAR DIMENSION IN END DIASTOLE: -1.72
Z-SCORE OF LEFT VENTRICULAR DIMENSION IN END SYSTOLE: 0.48
Z-SCORE OF LEFT VENTRICULAR POSTERIOR WALL IN END DIASTOLE: 1.61

## 2022-03-31 PROCEDURE — 3008F BODY MASS INDEX DOCD: CPT | Performed by: INTERNAL MEDICINE

## 2022-03-31 PROCEDURE — 93000 ELECTROCARDIOGRAM COMPLETE: CPT | Performed by: INTERNAL MEDICINE

## 2022-03-31 PROCEDURE — 93306 TTE W/DOPPLER COMPLETE: CPT | Performed by: INTERNAL MEDICINE

## 2022-03-31 PROCEDURE — 99214 OFFICE O/P EST MOD 30 MIN: CPT | Performed by: INTERNAL MEDICINE

## 2022-03-31 RX ORDER — AZELASTINE 1 MG/ML
2 SPRAY, METERED NASAL 2 TIMES DAILY
COMMUNITY
Start: 2022-02-28 | End: 2024-05-23

## 2022-03-31 RX ORDER — FAMOTIDINE 40 MG/1
20 TABLET, FILM COATED ORAL NIGHTLY
COMMUNITY
Start: 2022-02-28 | End: 2023-05-25

## 2022-03-31 ASSESSMENT — ENCOUNTER SYMPTOMS
BACK PAIN: 1
EYES NEGATIVE: 1
ENDOCRINE NEGATIVE: 1
GASTROINTESTINAL NEGATIVE: 1
PSYCHIATRIC NEGATIVE: 1
CONSTITUTIONAL NEGATIVE: 1
ROS SKIN COMMENTS: ECZEMA
HEMATOLOGIC/LYMPHATIC NEGATIVE: 1
NEUROLOGICAL NEGATIVE: 1
RESPIRATORY NEGATIVE: 1

## 2022-03-31 NOTE — TELEPHONE ENCOUNTER
Patient has accepted consultation on 4/21/22 @1037 with  , pt stated he will begetting a Cta done at [facility] , [facility] mailed

## 2022-03-31 NOTE — TELEPHONE ENCOUNTER
Jas Uriarte,   Dr Orozco asked me to reach out to you to get Mr Kaitlyn enrolled into the [facility].    He ordered a CTA of the chest which he will schedule shortly.    His cell # is [phone]    Thanks so much,   Mary Luo Field

## 2022-03-31 NOTE — TELEPHONE ENCOUNTER
No problem thanks Mary Lou! I have copied our team here to set up a visit and offer the patient a consult with Dr. Derrick Forrester for evaluation, looks like he has a stable aortic aneurysm.

## 2022-03-31 NOTE — LETTER
March 31, 2022     Magali Ibanez DO  [address]  [place] [address]    Patient: Dieter Sahni  YOB: 1950  Date of Visit: 3/31/2022      Dear Dr. Ibanez:    Thank you for referring Dieter Sahni to me for evaluation. Below are my notes for this consultation.    If you have questions, please do not hesitate to call me. I look forward to following your patient along with you.         Sincerely,        Domenico Orozco DO        CC: No Recipients  Domenico Orozco DO  3/31/2022  3:37 PM  Signed      Chief Complaint: I saw Whitney in the office today on a routine visit for his hypertension and aortic regurgitation.  He denies chest discomfort or shortness of breath with exertion, anxiety, cold weather, postprandially, with sex, at rest, or nocturnally.  He denies exertional dyspnea, proximal nocturnal dyspnea, orthopnea, cavitations, syncope, ankle edema, he has nocturia.  He also has a thoracic aortic aneurysm I did not get the CAT scan I had ordered because of the pandemic.  He has probably mild to moderate aortic regurgitation minimal aortic stenosis with a thoracic aortic aneurysm.       Medications:  Current Outpatient Medications   Medication Sig Dispense Refill   • amLODIPine (NORVASC) 2.5 mg tablet take 1 tablet by mouth once daily (Patient taking differently: Take 5 mg by mouth daily.) 90 tablet 3   • atorvastatin (LIPITOR) 20 mg tablet Take 20 mg by mouth once daily.     • azelastine (ASTELIN) 137 mcg (0.1 %) nasal spray Administer 2 sprays into each nostril 2 (two) times a day.     • cholecalciferol, vitamin D3, 100 mcg (4,000 unit) tablet Take 4,000 Units by mouth daily.     • cycloSPORINE (RESTASIS) 0.05 % ophthalmic emulsion Administer 1 drop into both eyes 2 (two) times a day.     • famotidine (PEPCID) 40 mg tablet Take 40 mg by mouth nightly.     • montelukast (SINGULAIR) 10 mg tablet Take 10 mg by mouth once daily.     • multivitamin tablet Take by mouth daily.      • tamsulosin (FLOMAX) 0.4 mg capsule Take 0.4 mg by mouth once daily.     • WIXELA INHUB 500-50 mcg/dose diskus inhaler Inhale 2 puffs daily.       No current facility-administered medications for this visit.       Review of Systems:  Review of Systems   Constitutional: Negative.   HENT: Negative.    Eyes: Negative.    Respiratory: Negative.    Endocrine: Negative.    Hematologic/Lymphatic: Negative.    Skin: Positive for rash.        eczema   Musculoskeletal: Positive for back pain.   Gastrointestinal: Negative.    Genitourinary: Positive for nocturia.   Neurological: Negative.    Psychiatric/Behavioral: Negative.    Allergic/Immunologic: Positive for environmental allergies.       Physical Exam:  Vitals:    03/31/22 1433   BP: 122/70   Pulse: 65   Resp: 16   Temp: 36.9 °C (98.4 °F)     Physical Exam  Constitutional:       Appearance: He is well-developed.   HENT:      Head: Normocephalic and atraumatic.   Eyes:      Conjunctiva/sclera: Conjunctivae normal.      Pupils: Pupils are equal, round, and reactive to light.   Cardiovascular:      Rate and Rhythm: Normal rate. Rhythm irregular.      Pulses: Intact distal pulses.      Heart sounds: Murmur (2/6 diastolic murmur) heard.   Pulmonary:      Effort: Pulmonary effort is normal.      Breath sounds: Normal breath sounds.   Abdominal:      General: Bowel sounds are normal.      Palpations: Abdomen is soft.   Musculoskeletal:         General: Normal range of motion.      Cervical back: Normal range of motion and neck supple.   Skin:     General: Skin is warm and dry.   Neurological:      Mental Status: He is alert and oriented to person, place, and time.      Deep Tendon Reflexes: Reflexes are normal and symmetric.   Psychiatric:         Speech: Speech normal.         Behavior: Behavior normal.         Thought Content: Thought content normal.         Judgment: Judgment normal.       EKG:SR PAC ST-tabn no change    Assessment and Plan:  Impression:  Hypertension  controlled.  Thoracic aortic aneurysm.  Aortic regurgitation.  Hypercholesterolemia.  Recommendation:  I have sent him to some extent because I told him he cannot lift more than 40 pounds and he has been working out with weights that are much heavier.  We ordered a CAT scan of his thoracic aorta asked him not to do strenuous lifting exercises and set him up for an appointment in the aortic wellness clinic.  We will see him in 6 months time, if there is a problem we will see him sooner.  Thank you for allowing us see this very nice gentleman in the office.  We forwarded the echo from today to you.    Domenico Orozco, DO

## 2022-03-31 NOTE — TELEPHONE ENCOUNTER
Hello- Mr Sahni needs to be scheduled for a CTA Chest with and without contrast  CPT code 40474  DX I35.1    He is going to schedule at [facility]  NPI # [id number]    insurance is [facility] ID # [id number]    1950    Will need precert  thx

## 2022-03-31 NOTE — PROGRESS NOTES
Chief Complaint: I saw Whitney in the office today on a routine visit for his hypertension and aortic regurgitation.  He denies chest discomfort or shortness of breath with exertion, anxiety, cold weather, postprandially, with sex, at rest, or nocturnally.  He denies exertional dyspnea, proximal nocturnal dyspnea, orthopnea, cavitations, syncope, ankle edema, he has nocturia.  He also has a thoracic aortic aneurysm I did not get the CAT scan I had ordered because of the pandemic.  He has probably mild to moderate aortic regurgitation minimal aortic stenosis with a thoracic aortic aneurysm.       Medications:  Current Outpatient Medications   Medication Sig Dispense Refill   • amLODIPine (NORVASC) 2.5 mg tablet take 1 tablet by mouth once daily (Patient taking differently: Take 5 mg by mouth daily.) 90 tablet 3   • atorvastatin (LIPITOR) 20 mg tablet Take 20 mg by mouth once daily.     • azelastine (ASTELIN) 137 mcg (0.1 %) nasal spray Administer 2 sprays into each nostril 2 (two) times a day.     • cholecalciferol, vitamin D3, 100 mcg (4,000 unit) tablet Take 4,000 Units by mouth daily.     • cycloSPORINE (RESTASIS) 0.05 % ophthalmic emulsion Administer 1 drop into both eyes 2 (two) times a day.     • famotidine (PEPCID) 40 mg tablet Take 40 mg by mouth nightly.     • montelukast (SINGULAIR) 10 mg tablet Take 10 mg by mouth once daily.     • multivitamin tablet Take by mouth daily.     • tamsulosin (FLOMAX) 0.4 mg capsule Take 0.4 mg by mouth once daily.     • WIXELA INHUB 500-50 mcg/dose diskus inhaler Inhale 2 puffs daily.       No current facility-administered medications for this visit.       Review of Systems:  Review of Systems   Constitutional: Negative.   HENT: Negative.    Eyes: Negative.    Respiratory: Negative.    Endocrine: Negative.    Hematologic/Lymphatic: Negative.    Skin: Positive for rash.        eczema   Musculoskeletal: Positive for back pain.   Gastrointestinal: Negative.    Genitourinary:  Positive for nocturia.   Neurological: Negative.    Psychiatric/Behavioral: Negative.    Allergic/Immunologic: Positive for environmental allergies.       Physical Exam:  Vitals:    03/31/22 1433   BP: 122/70   Pulse: 65   Resp: 16   Temp: 36.9 °C (98.4 °F)     Physical Exam  Constitutional:       Appearance: He is well-developed.   HENT:      Head: Normocephalic and atraumatic.   Eyes:      Conjunctiva/sclera: Conjunctivae normal.      Pupils: Pupils are equal, round, and reactive to light.   Cardiovascular:      Rate and Rhythm: Normal rate. Rhythm irregular.      Pulses: Intact distal pulses.      Heart sounds: Murmur (2/6 diastolic murmur) heard.   Pulmonary:      Effort: Pulmonary effort is normal.      Breath sounds: Normal breath sounds.   Abdominal:      General: Bowel sounds are normal.      Palpations: Abdomen is soft.   Musculoskeletal:         General: Normal range of motion.      Cervical back: Normal range of motion and neck supple.   Skin:     General: Skin is warm and dry.   Neurological:      Mental Status: He is alert and oriented to person, place, and time.      Deep Tendon Reflexes: Reflexes are normal and symmetric.   Psychiatric:         Speech: Speech normal.         Behavior: Behavior normal.         Thought Content: Thought content normal.         Judgment: Judgment normal.       EKG:SR PAC ST-tabn no change    Assessment and Plan:  Impression:  Hypertension controlled.  Thoracic aortic aneurysm.  Aortic regurgitation.  Hypercholesterolemia.  Recommendation:  I have sent him to some extent because I told him he cannot lift more than 40 pounds and he has been working out with weights that are much heavier.  We ordered a CAT scan of his thoracic aorta asked him not to do strenuous lifting exercises and set him up for an appointment in the aortic wellness clinic.  We will see him in 6 months time, if there is a problem we will see him sooner.  Thank you for allowing us see this very nice  gentleman in the office.  We forwarded the echo from today to you.    Domenico Orozco, DO

## 2022-04-01 ENCOUNTER — TELEPHONE (OUTPATIENT)
Dept: SCHEDULING | Facility: CLINIC | Age: 72
End: 2022-04-01
Payer: COMMERCIAL

## 2022-04-01 NOTE — TELEPHONE ENCOUNTER
Patient is requesting a return to work note be faxed to his employer for yesterday's OV (with or without limitations must be noted):    [employer]  Fax: [phone]    Pt can be reached at: [phone]

## 2022-04-01 NOTE — TELEPHONE ENCOUNTER
Patient was just seen yesterdays at [facility] location. Patient needs a requesting work note to be faxed to his employer. Thank you

## 2022-04-05 ENCOUNTER — TELEPHONE (OUTPATIENT)
Dept: SCHEDULING | Facility: CLINIC | Age: 72
End: 2022-04-05
Payer: COMMERCIAL

## 2022-04-05 NOTE — TELEPHONE ENCOUNTER
Spoke with patient.  Informed NO Fluoroquinolones, Levaquin included.  States he will call ENT  to let him know.  Informed ENT can call our office as well if needs patient history   or for any questions.  Thank you.

## 2022-04-05 NOTE — TELEPHONE ENCOUNTER
Pt is calling to ask if it's okay to take a antibiotic. The medication is: Levofloxacin 500 mg.    He says he read the antibiotic pamphlet which says to consult physician if you have an valve problems or HTN.     Pt's ENT would also like to start him on Prednisone 10 mg  He can be reached at: [phone]

## 2022-04-05 NOTE — TELEPHONE ENCOUNTER
Please see prior.  Patient has thoracic aortic aneurysm.  Likely is to avoid flouroquinolones.  Please advise.  Thank you.

## 2022-04-06 ENCOUNTER — TELEPHONE (OUTPATIENT)
Dept: CARDIOTHORACIC SURGERY | Facility: CLINIC | Age: 72
End: 2022-04-06
Payer: COMMERCIAL

## 2022-04-06 NOTE — TELEPHONE ENCOUNTER
Auth# [id number]        04/06/2022-06/04/2022    Please contact pt to provide auth # and assist with scheduling testing

## 2022-04-11 ENCOUNTER — TELEPHONE (OUTPATIENT)
Dept: SCHEDULING | Facility: CLINIC | Age: 72
End: 2022-04-11
Payer: COMMERCIAL

## 2022-04-11 NOTE — TELEPHONE ENCOUNTER
I increased his amlodipine to 10 mg daily till medrol dose pack is completed then let me know what his BP is running.

## 2022-04-11 NOTE — TELEPHONE ENCOUNTER
Pt of Dr. Orozco w/hx HTN, thoracic aortic aneurysm/AV regurgitation and hypercholesterolemia. Call today from pt to report recent elevation in BP readings.     Per pt, he has been on a prednisone taper since last Monday for laryngitis. 60mg x 3d, 50mg x 3d, 40mg x 3d, 30mg x 3, 20mg x 3d, 10mg x 3d then stop. Today he starts 30mg. He also started Bactrim BID on Friday 4/8.     Pt noted his BP usually runs 130/80's mmHg, but on Friday evening it was 175/94mmHg. Over the weekend, readings 150/80's and this morning BP is 159/87mmHg. Pt denied any symptoms from elev BP - no HA, CP, dizziness, lightheadedness, weakness or fatigue. Has some slight MCKEE on the stairs.    Currently, Mr. Sahni is on amlodipine 5mg every evening. He has not missed any doses. Denied any swelling. I advised Mr. Sahni that elev BP can be a side effect of his steroid use, but when pt suggested stopping the prednisone, I encouraged him to continue taper as directed. I advised pt that I would send a message to Dr. Orozco's team for recommendations of adjustments to his BP meds to help accommodate for BP elevation from steroid use.    Pt can be reached at [phone]. Thank you.

## 2022-04-11 NOTE — TELEPHONE ENCOUNTER
Please see prior RN note.  Spoke with patient.  States MCKEE on stairs is NEW in last week.  States he has had MCKEE in the past but improved when BK increased dose of amlodipine.  States does have postnasal drip and acid reflux.  States on famotidine with good results.  Recent Echo.  Due for CT Chest.  Please advise if any changes/recommendations.  Thank you.

## 2022-04-16 LAB
BUN SERPL-MCNC: 22 MG/DL (ref 8–27)
BUN/CREAT SERPL: 22 (ref 10–24)
CALCIUM SERPL-MCNC: 9.2 MG/DL (ref 8.6–10.2)
CHLORIDE SERPL-SCNC: 100 MMOL/L (ref 96–106)
CO2 SERPL-SCNC: 20 MMOL/L (ref 20–29)
CREAT SERPL-MCNC: 0.99 MG/DL (ref 0.76–1.27)
EGFRCR SERPLBLD CKD-EPI 2021: 81 ML/MIN/1.73
GLUCOSE SERPL-MCNC: 109 MG/DL (ref 65–99)
POTASSIUM SERPL-SCNC: 4.8 MMOL/L (ref 3.5–5.2)
SODIUM SERPL-SCNC: 136 MMOL/L (ref 134–144)

## 2022-04-21 NOTE — TELEPHONE ENCOUNTER
Patient has an appointment next Thursday with Dr. Forrester but as far as I can see is yet to have his CAT scan of his chest.  Is there any way that we can call this gentleman and see where he is at with scheduling the CTA.  If he does not have CTA prior to his appointment I want to postpone his appointment with Dr. Forrester until the CAT scan can be done.  As he already has authorization for the CAT scan done by Dr. Orozco's office

## 2022-04-21 NOTE — TELEPHONE ENCOUNTER
Pt is returning a call to the office to discuss how best to proceed at this time    He is reporting to completing his Prednisone 2 days ago    /70 w/ HR 63-65    Remains on Amlodipine 10 mg daily    Asymptomatic    Should he return back to Amlodipine 5 mg daily??    Please advise    Pt can be reached at [phone]

## 2022-04-21 NOTE — TELEPHONE ENCOUNTER
Spoke to pt. He said he had the CTA of chest done this morning @ [facility] and he will be bringing in a disc when he comes to his appt 4/28.

## 2022-04-22 ENCOUNTER — TELEPHONE (OUTPATIENT)
Dept: CARDIOLOGY | Facility: CLINIC | Age: 72
End: 2022-04-22
Payer: COMMERCIAL

## 2022-04-22 DIAGNOSIS — I35.1 NONRHEUMATIC AORTIC VALVE INSUFFICIENCY: ICD-10-CM

## 2022-04-22 RX ORDER — AMLODIPINE BESYLATE 10 MG/1
10 TABLET ORAL DAILY
Qty: 90 TABLET | Refills: 1 | Status: SHIPPED | OUTPATIENT
Start: 2022-04-22 | End: 2022-04-22 | Stop reason: SDUPTHER

## 2022-04-22 RX ORDER — AMLODIPINE BESYLATE 10 MG/1
10 TABLET ORAL DAILY
Qty: 90 TABLET | Refills: 1 | Status: SHIPPED | OUTPATIENT
Start: 2022-04-22 | End: 2022-05-16

## 2022-04-22 NOTE — TELEPHONE ENCOUNTER
Return call from pt stating that [facility] would not fill his rx due to being 'too early'    E-scription re-sent with notification that the dose was increased

## 2022-04-22 NOTE — TELEPHONE ENCOUNTER
Spoke with patient.  States feels good.  States BP around 120/70.  Informed continue amlodipine 10 mg daily.  Informed will send script for 10 mg tablet,currently using 2.5 mg tablets.  Informed amlodipine 10 mg tablet, take 1 tablet daily.  Script sent.  Asked he call for any questions or concerns.

## 2022-04-27 ENCOUNTER — TELEPHONE (OUTPATIENT)
Dept: CARDIOTHORACIC SURGERY | Facility: CLINIC | Age: 72
End: 2022-04-27
Payer: COMMERCIAL

## 2022-04-27 NOTE — TELEPHONE ENCOUNTER
Called patient and he excepted a new consult date of May 12 at 1 PM to meet with Dr. Forrester for his dilated ascending aorta and aortic insufficiency.

## 2022-05-09 ASSESSMENT — ENCOUNTER SYMPTOMS
DIZZINESS: 0
DIFFICULTY URINATING: 0
WOUND: 0
BACK PAIN: 0
AGITATION: 0
NERVOUS/ANXIOUS: 0
ARTHRALGIAS: 0
WEAKNESS: 0
PALPITATIONS: 0
FATIGUE: 0
CHEST TIGHTNESS: 0
NAUSEA: 0
COUGH: 0
CONFUSION: 0

## 2022-05-09 NOTE — PROGRESS NOTES
Cardiac Surgery    Reason for consultation:Dilated ascending aorta, aortic insufficiency    HPI  Patient is a 71 y.o. male here for a surgical consultation for Dilated ascending aorta as well as aortic insufficiency They were referred by Dr. Orozco.  Mr. Dieter Sahni is here today alone.  He has been followed by Dr. Orozco and an echo was done that revealed mild aortic insufficiency and a dilated ascending aorta.  He currently has symptoms of shortness of breath and more recently LE edema since his amlodipine dose was increased from 5mg to 10mg. These symptoms have been present for a month.  He denies a family history of aortic dissection, chest pain or back pain.  PMH: HTN    PCP:  Magali Ibanez  Cardiologist: Dr. Orozco    Medical History:   Past Medical History:   Diagnosis Date   • Acute bronchitis 01/2022   • Aneurysm (CMS/HCC)    • Asthma    • Enlarged prostate    • Lipid disorder    • SOB (shortness of breath)        Surgical History:   Past Surgical History:   Procedure Laterality Date   • HERNIA REPAIR  03/2019       Allergies: Latex, Nut - unspecified, and Penicillins    Current Outpatient Medications   Medication Sig Dispense Refill   • atorvastatin (LIPITOR) 20 mg tablet Take 20 mg by mouth once daily.     • cholecalciferol, vitamin D3, 100 mcg (4,000 unit) tablet Take 4,000 Units by mouth daily.     • famotidine (PEPCID) 40 mg tablet Take 40 mg by mouth nightly.     • montelukast (SINGULAIR) 10 mg tablet Take 10 mg by mouth once daily.     • multivitamin tablet Take by mouth daily.     • tamsulosin (FLOMAX) 0.4 mg capsule Take 0.4 mg by mouth once daily.     • WIXELA INHUB 500-50 mcg/dose diskus inhaler Inhale 2 puffs daily.     • amLODIPine (NORVASC) 5 mg tablet Take 1 tablet (5 mg total) by mouth daily. 90 tablet 3   • azelastine (ASTELIN) 137 mcg (0.1 %) nasal spray Administer 2 sprays into each nostril 2 (two) times a day.     • cycloSPORINE (RESTASIS) 0.05 % ophthalmic emulsion  Administer 1 drop into both eyes 2 (two) times a day.     • valsartan (DIOVAN) 80 mg tablet Take 1 tablet (80 mg total) by mouth daily. 90 tablet 3     No current facility-administered medications for this visit.       Social History:   Social History     Socioeconomic History   • Marital status:      Spouse name: None   • Number of children: None   • Years of education: None   • Highest education level: None   Tobacco Use   • Smoking status: Former Smoker     Packs/day: 0.25     Years: 12.00     Pack years: 3.00     Types: Cigarettes     Quit date: 2021     Years since quittin.5   • Smokeless tobacco: Never Used   Substance and Sexual Activity   • Alcohol use: Yes     Alcohol/week: 14.0 standard drinks     Types: 14 Glasses of wine per week   • Drug use: Defer   • Sexual activity: Defer       Family History:   Family History   Problem Relation Age of Onset   • Kidney disease Biological Mother    • Alzheimer's disease Biological Mother    • Hypertension Biological Father    • Diabetes Biological Father        Review of Systems  Review of Systems   Constitutional: Negative for fatigue.   HENT: Negative for postnasal drip.    Eyes: Negative for visual disturbance.   Respiratory: Positive for shortness of breath. Negative for cough and chest tightness.    Cardiovascular: Positive for leg swelling. Negative for chest pain and palpitations.   Gastrointestinal: Negative for nausea.   Genitourinary: Negative for difficulty urinating.   Musculoskeletal: Negative for arthralgias, back pain and gait problem.   Skin: Negative for wound.   Neurological: Negative for dizziness, syncope and weakness.   Psychiatric/Behavioral: Negative for agitation, behavioral problems and confusion. The patient is not nervous/anxious.    All other systems reviewed and are negative.      Objective     Vital Signs for the last 24 hours:  Visit Vitals  /65 (BP Location: Left upper arm, Patient Position: Sitting)   Pulse 92  "  Temp (!) 35.8 °C (96.4 °F) (Temporal)   Resp 18   Ht 1.778 m (5' 10\")   Wt 79.4 kg (175 lb)   SpO2 98%   BMI 25.11 kg/m²       Physical Exam  Vitals reviewed.   Constitutional:       General: He is not in acute distress.     Appearance: Normal appearance. He is well-developed and normal weight.   HENT:      Head: Normocephalic.   Eyes:      Pupils: Pupils are equal, round, and reactive to light.   Neck:      Vascular: No JVD.   Cardiovascular:      Rate and Rhythm: Normal rate and regular rhythm.      Pulses: Normal pulses.      Heart sounds: Murmur heard.   Pulmonary:      Effort: Pulmonary effort is normal.      Breath sounds: Normal breath sounds.   Abdominal:      General: Bowel sounds are normal.      Palpations: Abdomen is soft.   Musculoskeletal:         General: No swelling. Normal range of motion.      Cervical back: Normal range of motion and neck supple.      Right lower leg: No edema.      Left lower leg: No edema.   Skin:     General: Skin is warm and dry.      Capillary Refill: Capillary refill takes 2 to 3 seconds.   Neurological:      General: No focal deficit present.      Mental Status: He is alert and oriented to person, place, and time. Mental status is at baseline.   Psychiatric:         Mood and Affect: Mood normal.         Behavior: Behavior normal.         Thought Content: Thought content normal.         Judgment: Judgment normal.         Cardiac Imaging    TRANSTHORACIC ECHO (TTE) COMPLETE 03/31/2022    Interpretation Summary  · Normal-sized LV. Normal LV systolic function. Estimated EF 60%. Normal LV septal wall motion. No regional wall motion abnormalities. Normal LV wall thickness. Grade II LV diastolic dysfunction. LV diastolic inflow pattern suggestive of elevated LV filling pressure.  · Normal-sized RV. Normal RV systolic function.  · Mildly dilated LA. No patent foramen ovale present as seen in the LA. Intact LA septum present.  · Normal-sized RA.  · Aortic root sclerotic and " calcificied. Sinuses of Valsalva calcified. Ascending aorta calcified. Aortic arch grossly normal. Descending aorta difficult to visualize. Mild dilatation of the aorta at the sinuses of Valsalva. Aortic aneurysm present in the aortic root (sinus level).  · Tricuspid aortic valve. Mild aortic valve regurgitation with an eccentrically directed jet. Mild aortic valve stenosis. Study suggestive of low gradient aortic valve stenosis.  · Grossly normal leaflet structure of the mitral valve.  · Mild mitral valve regurgitation. The jet is centrally directed.  · Tricuspid valve structure is grossly normal.  · Pulmonic valve not well visualized.  · IVC is a small caliber vessel (<1.7cm). IVC collapses >50% during inspiration.  · Normal pericardial structure. No evidence of pericardial effusion. No cardiac tamponade.    The left ventricle is normal in dimension and motion.  The ejection fraction is 60%.  There is diastolic dysfunction type II.  The left atrium is mildly enlarged.  The mitral valve is grossly normal.  There is mild mitral valve annular calcification.  There is mild mitral regurgitation.  The right ventricle is normal in dimension and motion.  The right atrium is normal dimension.  The tricuspid valve is grossly normal.  The aortic valve is 3 cusps there is aortic regurgitation I believe is mild.  The aortic root is calcified and dilated at 4.4 cm at the sinus of Valsalva.  The inferior vena cava is normal in dimension and motion.  3/31/22 CTA chest              Assessment/Plan     Dilated descending Aorta    Assessment:  Echo and CTA personally reviewed by Dr Forrester. Mild aortic insufficiency with a normal EF.   No significant aneurysm.   Difficult to measure as it is not uploadable to [facility].  Ascending aorta measures ,  cm at the level of the PA bifurcation, 3.8cm at the level of the innominate, 3.6. Based of a BSA of 2.0, the patient is at low risk of dissection or rupture.  Arch normal.       Plan:   No  indication for surgical intervention at this time.   We will need a CTA of the chest repeated in 1 year with a follow up with the [facility].  Recommended aggressive hypertension management. Consider adding an ACE/ARB for HTN and decreasing amlodipine.  Also reviewed no heavy lifting or straining as life style modification.       I spent 60 minutes on this date of service performing the following activities: obtaining history, performing examination, entering orders, documenting, providing counseling and education, independently reviewing study/studies, communicating results, and coordinating care.       I, JARON Ferguson, am scribing for and in the presence of Dr. Forrester.        Derrick Forrester MD

## 2022-05-12 ENCOUNTER — CONSULT (OUTPATIENT)
Dept: CARDIOTHORACIC SURGERY | Facility: CLINIC | Age: 72
End: 2022-05-12
Payer: COMMERCIAL

## 2022-05-12 VITALS
SYSTOLIC BLOOD PRESSURE: 128 MMHG | OXYGEN SATURATION: 98 % | DIASTOLIC BLOOD PRESSURE: 65 MMHG | RESPIRATION RATE: 18 BRPM | BODY MASS INDEX: 25.05 KG/M2 | WEIGHT: 175 LBS | HEART RATE: 92 BPM | HEIGHT: 70 IN | TEMPERATURE: 96.4 F

## 2022-05-12 DIAGNOSIS — I71.20 THORACIC AORTIC ANEURYSM WITHOUT RUPTURE (CMS/HCC): Primary | ICD-10-CM

## 2022-05-12 PROCEDURE — 3008F BODY MASS INDEX DOCD: CPT | Performed by: THORACIC SURGERY (CARDIOTHORACIC VASCULAR SURGERY)

## 2022-05-12 PROCEDURE — 99204 OFFICE O/P NEW MOD 45 MIN: CPT | Performed by: THORACIC SURGERY (CARDIOTHORACIC VASCULAR SURGERY)

## 2022-05-12 ASSESSMENT — ENCOUNTER SYMPTOMS: SHORTNESS OF BREATH: 1

## 2022-05-16 ENCOUNTER — TELEPHONE (OUTPATIENT)
Dept: SCHEDULING | Facility: CLINIC | Age: 72
End: 2022-05-16
Payer: COMMERCIAL

## 2022-05-16 RX ORDER — AMLODIPINE BESYLATE 5 MG/1
5 TABLET ORAL DAILY
Qty: 90 TABLET | Refills: 3 | Status: SHIPPED | OUTPATIENT
Start: 2022-05-16 | End: 2023-03-23 | Stop reason: SDUPTHER

## 2022-05-16 RX ORDER — VALSARTAN 80 MG/1
80 TABLET ORAL DAILY
Qty: 90 TABLET | Refills: 3 | Status: SHIPPED | OUTPATIENT
Start: 2022-05-16 | End: 2023-05-01 | Stop reason: SDUPTHER

## 2022-05-16 NOTE — TELEPHONE ENCOUNTER
Patient taking 10mg of amlodiphine and was referred to Dr Daily who believe the switch from 5mg to 10mg of amlodiphine is causing  feet to swell.    Please contact patient to discuss

## 2022-05-16 NOTE — PROGRESS NOTES
Received phone call from patient about lower ext. Edema secondary to 10 mg of amlodipine.  Decreased amlodipine to 5 mg in PM and added valsartan 80 mg daily in am. To decrease edema and keep control of BP which has beeen running below 130/80.  Asked to be seen by Magali Davis. In 1 month for BP check. Rx'x sent

## 2022-09-19 ENCOUNTER — APPOINTMENT (RX ONLY)
Dept: URBAN - METROPOLITAN AREA CLINIC 28 | Facility: CLINIC | Age: 72
Setting detail: DERMATOLOGY
End: 2022-09-19

## 2022-09-19 DIAGNOSIS — L20.89 OTHER ATOPIC DERMATITIS: ICD-10-CM | Status: WORSENING

## 2022-09-19 PROCEDURE — ? MEDICATION COUNSELING

## 2022-09-19 PROCEDURE — ? PHOTO-DOCUMENTATION

## 2022-09-19 PROCEDURE — ? PRESCRIPTION MEDICATION MANAGEMENT

## 2022-09-19 PROCEDURE — ? PRESCRIPTION

## 2022-09-19 PROCEDURE — 99204 OFFICE O/P NEW MOD 45 MIN: CPT

## 2022-09-19 PROCEDURE — ? COUNSELING: TOPICAL STEROIDS

## 2022-09-19 PROCEDURE — ? COUNSELING

## 2022-09-19 RX ORDER — BETAMETHASONE DIPROPIONATE 0.5 MG/ML
1 LOTION TOPICAL
Qty: 60 | Refills: 3 | Status: ERX | COMMUNITY
Start: 2022-09-19

## 2022-09-19 RX ORDER — TRIAMCINOLONE ACETONIDE 1 MG/G
1 CREAM TOPICAL BID
Qty: 453.6 | Refills: 3 | Status: ERX | COMMUNITY
Start: 2022-09-19

## 2022-09-19 RX ORDER — KETOCONAZOLE 20 MG/ML
1 SHAMPOO, SUSPENSION TOPICAL
Qty: 120 | Refills: 3 | Status: ERX | COMMUNITY
Start: 2022-09-19

## 2022-09-19 RX ORDER — EMOLLIENT COMBINATION NO.32
1 EMULSION, EXTENDED RELEASE TOPICAL
Qty: 225 | Refills: 3 | Status: ERX | COMMUNITY
Start: 2022-09-19

## 2022-09-19 RX ORDER — HYDROCORTISONE 25 MG/G
1 CREAM TOPICAL BID
Qty: 30 | Refills: 3 | Status: ERX | COMMUNITY
Start: 2022-09-19

## 2022-09-19 RX ADMIN — BETAMETHASONE DIPROPIONATE 1: 0.5 LOTION TOPICAL at 00:00

## 2022-09-19 RX ADMIN — HYDROCORTISONE 1: 25 CREAM TOPICAL at 00:00

## 2022-09-19 RX ADMIN — Medication 1: at 00:00

## 2022-09-19 RX ADMIN — TRIAMCINOLONE ACETONIDE 1: 1 CREAM TOPICAL at 00:00

## 2022-09-19 RX ADMIN — KETOCONAZOLE 1: 20 SHAMPOO, SUSPENSION TOPICAL at 00:00

## 2022-09-19 ASSESSMENT — LOCATION DETAILED DESCRIPTION DERM
LOCATION DETAILED: LEFT VENTRAL DISTAL FOREARM
LOCATION DETAILED: RIGHT PROXIMAL PRETIBIAL REGION
LOCATION DETAILED: LEFT DISTAL CALF
LOCATION DETAILED: RIGHT DISTAL CALF
LOCATION DETAILED: RIGHT DISTAL POSTERIOR THIGH
LOCATION DETAILED: RIGHT PROXIMAL DORSAL FOREARM
LOCATION DETAILED: LEFT PROXIMAL DORSAL FOREARM
LOCATION DETAILED: LEFT DISTAL POSTERIOR THIGH
LOCATION DETAILED: RIGHT ANTERIOR PROXIMAL THIGH
LOCATION DETAILED: RIGHT MEDIAL SUPERIOR CHEST
LOCATION DETAILED: LEFT SUPERIOR MEDIAL UPPER BACK
LOCATION DETAILED: LEFT PROXIMAL PRETIBIAL REGION
LOCATION DETAILED: LEFT ANTERIOR PROXIMAL THIGH
LOCATION DETAILED: RIGHT VENTRAL PROXIMAL FOREARM

## 2022-09-19 ASSESSMENT — BSA ECZEMA: % BODY COVERED IN ECZEMA: 25

## 2022-09-19 ASSESSMENT — LOCATION SIMPLE DESCRIPTION DERM
LOCATION SIMPLE: RIGHT FOREARM
LOCATION SIMPLE: LEFT POSTERIOR THIGH
LOCATION SIMPLE: RIGHT PRETIBIAL REGION
LOCATION SIMPLE: LEFT CALF
LOCATION SIMPLE: LEFT THIGH
LOCATION SIMPLE: LEFT FOREARM
LOCATION SIMPLE: CHEST
LOCATION SIMPLE: RIGHT THIGH
LOCATION SIMPLE: RIGHT POSTERIOR THIGH
LOCATION SIMPLE: LEFT PRETIBIAL REGION
LOCATION SIMPLE: RIGHT CALF
LOCATION SIMPLE: LEFT UPPER BACK

## 2022-09-19 ASSESSMENT — ITCH NUMERIC RATING SCALE: HOW SEVERE IS YOUR ITCHING?: 6

## 2022-09-19 ASSESSMENT — LOCATION ZONE DERM
LOCATION ZONE: TRUNK
LOCATION ZONE: LEG
LOCATION ZONE: ARM

## 2022-09-19 ASSESSMENT — SEVERITY ASSESSMENT 2020: SEVERITY 2020: SEVERE

## 2022-09-19 NOTE — PROCEDURE: MEDICATION COUNSELING
126 Niacinamide Counseling: I recommended taking niacin or niacinamide, also know as vitamin B3, twice daily. Recent evidence suggests that taking vitamin B3 (500 mg twice daily) can reduce the risk of actinic keratoses and non-melanoma skin cancers. Side effects of vitamin B3 include flushing and headache.

## 2022-09-19 NOTE — PROCEDURE: PRESCRIPTION MEDICATION MANAGEMENT
Render In Strict Bullet Format?: No
Detail Level: Zone
Plan: Tpriya kay
Initiate Treatment: EpiCeram topical emulsion, extended release: Apply a thin layer to the body QDAY after shower\\ntriamcinolone acetonide 0.1 % topical cream: Apply a thin layer BID to AA of body (not face) PRN flare\\nhydrocortisone 2.5 % topical cream: Apply a thin layer to AA of face BID.\\nbetamethasone dipropionate 0.05 % lotion: Apply a few scattered drops to the scalp QHS\\nketoconazole 2 % shampoo: Wash scalp qoday allow to sit 3-5 minutes before rinsing

## 2022-09-19 NOTE — PROCEDURE: MEDICATION COUNSELING
Xelannelz Pregnancy And Lactation Text: This medication is Pregnancy Category D and is not considered safe during pregnancy.  The risk during breast feeding is also uncertain.

## 2022-09-19 NOTE — PROCEDURE: MIPS QUALITY
Quality 226: Preventive Care And Screening: Tobacco Use: Screening And Cessation Intervention: Patient screened for tobacco use and is an ex/non-smoker
Quality 111:Pneumonia Vaccination Status For Older Adults: Pneumococcal vaccine (PPSV23) administered on or after patient’s 60th birthday and before the end of the measurement period
Detail Level: Detailed
Quality 130: Documentation Of Current Medications In The Medical Record: Current Medications Documented
Quality 431: Preventive Care And Screening: Unhealthy Alcohol Use - Screening: Patient identified as an unhealthy alcohol user when screened for unhealthy alcohol use using a systematic screening method and received brief counseling

## 2022-09-22 ENCOUNTER — OFFICE VISIT (OUTPATIENT)
Dept: CARDIOLOGY | Facility: CLINIC | Age: 72
End: 2022-09-22
Payer: COMMERCIAL

## 2022-09-22 VITALS
HEIGHT: 70 IN | WEIGHT: 173.4 LBS | DIASTOLIC BLOOD PRESSURE: 76 MMHG | RESPIRATION RATE: 16 BRPM | HEART RATE: 62 BPM | SYSTOLIC BLOOD PRESSURE: 120 MMHG | BODY MASS INDEX: 24.82 KG/M2

## 2022-09-22 DIAGNOSIS — I35.1 NONRHEUMATIC AORTIC VALVE INSUFFICIENCY: Primary | ICD-10-CM

## 2022-09-22 PROCEDURE — 93000 ELECTROCARDIOGRAM COMPLETE: CPT | Performed by: INTERNAL MEDICINE

## 2022-09-22 PROCEDURE — 3008F BODY MASS INDEX DOCD: CPT | Performed by: INTERNAL MEDICINE

## 2022-09-22 PROCEDURE — 99214 OFFICE O/P EST MOD 30 MIN: CPT | Performed by: INTERNAL MEDICINE

## 2022-09-22 RX ORDER — KETOCONAZOLE 20 MG/ML
SHAMPOO, SUSPENSION TOPICAL AS NEEDED
COMMUNITY
Start: 2022-09-20 | End: 2023-11-30

## 2022-09-22 RX ORDER — EPINEPHRINE 0.3 MG/.3ML
INJECTION SUBCUTANEOUS AS NEEDED
COMMUNITY
Start: 2022-09-14

## 2022-09-22 NOTE — LETTER
September 22, 2022     Magali Ibanez DO  [address]  [place] [address]    Patient: Dieter Sahni  YOB: 1950  Date of Visit: 9/22/2022      Dear Dr. Ibanez:    Thank you for referring Dieter Sahni to me for evaluation. Below are my notes for this consultation.    If you have questions, please do not hesitate to call me. I look forward to following your patient along with you.         Sincerely,        Domenico Orozco DO        CC: No Recipients  Domenico Orozco DO  9/22/2022  2:02 PM  Signed      Chief Complaint: I saw Angelina in the office today on a routine visit for his hypertension.  He is hemodynamically stable.  He denies any form of chest discomfort or shortness of breath with exertion, anxiety, cold weather, postprandially, with sex, at rest, or nocturnally.  He denies exertional dyspnea, proximal nocturnal dyspnea, orthopnea, palpitations, syncope, ankle edema, he has nocturia.     Medications:  Current Outpatient Medications   Medication Sig Dispense Refill    amLODIPine (NORVASC) 5 mg tablet Take 1 tablet (5 mg total) by mouth daily. (Patient taking differently: Take 10 mg by mouth daily.) 90 tablet 3    atorvastatin (LIPITOR) 20 mg tablet Take 20 mg by mouth once daily.      azelastine (ASTELIN) 137 mcg (0.1 %) nasal spray Administer 2 sprays into each nostril 2 (two) times a day.      cholecalciferol, vitamin D3, 100 mcg (4,000 unit) tablet Take 4,000 Units by mouth daily.      EPINEPHrine (EPIPEN) 0.3 mg/0.3 mL injection syringe Inject into the thigh as needed.      famotidine (PEPCID) 40 mg tablet Take 40 mg by mouth nightly.      ketoconazole (NIZORAL) 2 % shampoo Apply topically as needed.      multivitamin tablet Take by mouth daily.      tamsulosin (FLOMAX) 0.4 mg capsule Take 0.4 mg by mouth once daily.      valsartan (DIOVAN) 80 mg tablet Take 1 tablet (80 mg total) by mouth daily. 90 tablet 3    WIXELA INHUB 500-50 mcg/dose diskus inhaler  Inhale 2 puffs daily.      cycloSPORINE (RESTASIS) 0.05 % ophthalmic emulsion Administer 1 drop into both eyes 2 (two) times a day.      montelukast (SINGULAIR) 10 mg tablet Take 10 mg by mouth once daily.       No current facility-administered medications for this visit.       Review of Systems:  He is a pleasant 72-year-old white male no acute distress alert and x3.  General he is awake alert and oriented.  Eyes: He denies blurry vision or double vision.  Ears nose and throat no congestion hoarseness or difficulty hearing.  Respiratory no productive cough.  GI stools daily x1 [place] formed weight stable.  : Nocturia.  Musculoskeletal: No claudication.  Extremities no edema.  Skin eczema changes.  Neurological: No paresthesias anesthesias or tremors.  Psych no depression anxiety or confusion.  Endocrine: He tolerates the heat in the cold.  Heme-onc: No abnormal bruising or bleeding.  Musculoskeletal: No back pain joint pain or muscle pain.  Allergy immunology: Seasonal allergies and eczema  Physical Exam:  Vitals:    09/22/22 1337   BP: 120/76   Pulse: 62   Resp: 16     Physical Exam  Constitutional:       Appearance: He is well-developed.   HENT:      Head: Normocephalic and atraumatic.   Eyes:      Conjunctiva/sclera: Conjunctivae normal.      Pupils: Pupils are equal, round, and reactive to light.   Cardiovascular:      Rate and Rhythm: Normal rate and regular rhythm.      Pulses: Intact distal pulses.      Heart sounds:     Gallop (S4) present.   Pulmonary:      Effort: Pulmonary effort is normal.      Breath sounds: Normal breath sounds.   Abdominal:      General: Bowel sounds are normal.      Palpations: Abdomen is soft.   Musculoskeletal:         General: Normal range of motion.      Cervical back: Normal range of motion and neck supple.   Skin:     General: Skin is warm and dry.   Neurological:      Mental Status: He is alert and oriented to person, place, and time.      Deep Tendon Reflexes: Reflexes  are normal and symmetric.   Psychiatric:         Speech: Speech normal.         Behavior: Behavior normal.         Thought Content: Thought content normal.         Judgment: Judgment normal.       EKG: SR PRWP V1-4 -[place] mariel    Assessment and Plan:  Impression:  Hypertension controlled.  Seasonal allergies.  Eczema.  Hyperlipidemia.  GERD.  Recommendation he is hemodynamically stable I did not make any changes in his medications today.  We will see him again in 3 months time to clear her for his anticipated colonoscopy.  In the meantime he will continue to exercise and take his meds because he feels very well.  Thank you for allowing us see this very nice gentleman in the office today.    Domenico Orozco, DO

## 2022-09-22 NOTE — PROGRESS NOTES
Chief Complaint: I saw Angelina in the office today on a routine visit for his hypertension.  He is hemodynamically stable.  He denies any form of chest discomfort or shortness of breath with exertion, anxiety, cold weather, postprandially, with sex, at rest, or nocturnally.  He denies exertional dyspnea, proximal nocturnal dyspnea, orthopnea, palpitations, syncope, ankle edema, he has nocturia.     Medications:  Current Outpatient Medications   Medication Sig Dispense Refill    amLODIPine (NORVASC) 5 mg tablet Take 1 tablet (5 mg total) by mouth daily. (Patient taking differently: Take 10 mg by mouth daily.) 90 tablet 3    atorvastatin (LIPITOR) 20 mg tablet Take 20 mg by mouth once daily.      azelastine (ASTELIN) 137 mcg (0.1 %) nasal spray Administer 2 sprays into each nostril 2 (two) times a day.      cholecalciferol, vitamin D3, 100 mcg (4,000 unit) tablet Take 4,000 Units by mouth daily.      EPINEPHrine (EPIPEN) 0.3 mg/0.3 mL injection syringe Inject into the thigh as needed.      famotidine (PEPCID) 40 mg tablet Take 40 mg by mouth nightly.      ketoconazole (NIZORAL) 2 % shampoo Apply topically as needed.      multivitamin tablet Take by mouth daily.      tamsulosin (FLOMAX) 0.4 mg capsule Take 0.4 mg by mouth once daily.      valsartan (DIOVAN) 80 mg tablet Take 1 tablet (80 mg total) by mouth daily. 90 tablet 3    WIXELA INHUB 500-50 mcg/dose diskus inhaler Inhale 2 puffs daily.      cycloSPORINE (RESTASIS) 0.05 % ophthalmic emulsion Administer 1 drop into both eyes 2 (two) times a day.      montelukast (SINGULAIR) 10 mg tablet Take 10 mg by mouth once daily.       No current facility-administered medications for this visit.       Review of Systems:  He is a pleasant 72-year-old white male no acute distress alert and x3.  General he is awake alert and oriented.  Eyes: He denies blurry vision or double vision.  Ears nose and throat no congestion hoarseness or difficulty  hearing.  Respiratory no productive cough.  GI stools daily x1 [place] formed weight stable.  : Nocturia.  Musculoskeletal: No claudication.  Extremities no edema.  Skin eczema changes.  Neurological: No paresthesias anesthesias or tremors.  Psych no depression anxiety or confusion.  Endocrine: He tolerates the heat in the cold.  Heme-onc: No abnormal bruising or bleeding.  Musculoskeletal: No back pain joint pain or muscle pain.  Allergy immunology: Seasonal allergies and eczema  Physical Exam:  Vitals:    09/22/22 1337   BP: 120/76   Pulse: 62   Resp: 16     Physical Exam  Constitutional:       Appearance: He is well-developed.   HENT:      Head: Normocephalic and atraumatic.   Eyes:      Conjunctiva/sclera: Conjunctivae normal.      Pupils: Pupils are equal, round, and reactive to light.   Cardiovascular:      Rate and Rhythm: Normal rate and regular rhythm.      Pulses: Intact distal pulses.      Heart sounds:     Gallop (S4) present.   Pulmonary:      Effort: Pulmonary effort is normal.      Breath sounds: Normal breath sounds.   Abdominal:      General: Bowel sounds are normal.      Palpations: Abdomen is soft.   Musculoskeletal:         General: Normal range of motion.      Cervical back: Normal range of motion and neck supple.   Skin:     General: Skin is warm and dry.   Neurological:      Mental Status: He is alert and oriented to person, place, and time.      Deep Tendon Reflexes: Reflexes are normal and symmetric.   Psychiatric:         Speech: Speech normal.         Behavior: Behavior normal.         Thought Content: Thought content normal.         Judgment: Judgment normal.       EKG: SR PRWP V1-4 ST-Tabn nochange    Assessment and Plan:  Impression:  Hypertension controlled.  Seasonal allergies.  Eczema.  Hyperlipidemia.  GERD.  Recommendation he is hemodynamically stable I did not make any changes in his medications today.  We will see him again in 3 months time to clear her for his anticipated  colonoscopy.  In the meantime he will continue to exercise and take his meds because he feels very well.  Thank you for allowing us see this very nice gentleman in the office today.    Domenico Orozco, DO

## 2022-09-30 ENCOUNTER — TELEPHONE (OUTPATIENT)
Dept: SCHEDULING | Facility: CLINIC | Age: 72
End: 2022-09-30
Payer: COMMERCIAL

## 2022-10-17 ENCOUNTER — APPOINTMENT (RX ONLY)
Dept: URBAN - METROPOLITAN AREA CLINIC 28 | Facility: CLINIC | Age: 72
Setting detail: DERMATOLOGY
End: 2022-10-17

## 2022-10-17 ENCOUNTER — RX ONLY (OUTPATIENT)
Age: 72
Setting detail: RX ONLY
End: 2022-10-17

## 2022-10-17 DIAGNOSIS — L20.89 OTHER ATOPIC DERMATITIS: ICD-10-CM

## 2022-10-17 PROCEDURE — 99213 OFFICE O/P EST LOW 20 MIN: CPT

## 2022-10-17 PROCEDURE — ? PRESCRIPTION MEDICATION MANAGEMENT

## 2022-10-17 PROCEDURE — ? COUNSELING

## 2022-10-17 PROCEDURE — ? PHOTO-DOCUMENTATION

## 2022-10-17 RX ORDER — BETAMETHASONE DIPROPIONATE 0.5 MG/ML
LOTION TOPICAL QHS
Qty: 60 | Refills: 3 | Status: ERX

## 2022-10-17 ASSESSMENT — LOCATION SIMPLE DESCRIPTION DERM
LOCATION SIMPLE: LEFT UPPER BACK
LOCATION SIMPLE: LEFT THIGH
LOCATION SIMPLE: LEFT POSTERIOR THIGH
LOCATION SIMPLE: RIGHT THIGH
LOCATION SIMPLE: LEFT CALF
LOCATION SIMPLE: RIGHT FOREARM
LOCATION SIMPLE: RIGHT CALF
LOCATION SIMPLE: LEFT PRETIBIAL REGION
LOCATION SIMPLE: RIGHT POSTERIOR THIGH
LOCATION SIMPLE: CHEST
LOCATION SIMPLE: RIGHT PRETIBIAL REGION
LOCATION SIMPLE: LEFT FOREARM

## 2022-10-17 ASSESSMENT — LOCATION DETAILED DESCRIPTION DERM
LOCATION DETAILED: LEFT DISTAL CALF
LOCATION DETAILED: LEFT ANTERIOR PROXIMAL THIGH
LOCATION DETAILED: LEFT PROXIMAL PRETIBIAL REGION
LOCATION DETAILED: RIGHT ANTERIOR PROXIMAL THIGH
LOCATION DETAILED: LEFT DISTAL POSTERIOR THIGH
LOCATION DETAILED: RIGHT DISTAL POSTERIOR THIGH
LOCATION DETAILED: LEFT PROXIMAL DORSAL FOREARM
LOCATION DETAILED: RIGHT PROXIMAL PRETIBIAL REGION
LOCATION DETAILED: RIGHT MEDIAL SUPERIOR CHEST
LOCATION DETAILED: RIGHT VENTRAL PROXIMAL FOREARM
LOCATION DETAILED: LEFT SUPERIOR MEDIAL UPPER BACK
LOCATION DETAILED: LEFT VENTRAL DISTAL FOREARM
LOCATION DETAILED: RIGHT PROXIMAL DORSAL FOREARM
LOCATION DETAILED: RIGHT DISTAL CALF

## 2022-10-17 ASSESSMENT — LOCATION ZONE DERM
LOCATION ZONE: TRUNK
LOCATION ZONE: LEG
LOCATION ZONE: ARM

## 2022-10-17 NOTE — PROCEDURE: PRESCRIPTION MEDICATION MANAGEMENT
Render In Strict Bullet Format?: No
Detail Level: Zone
Plan: Tpriya kay
Continue Regimen: EpiCeram topical emulsion, extended release: Apply a thin layer to the body QDAY after shower\\ntriamcinolone acetonide 0.1% topical cream: Apply a thin layer BID to AA of body (not face) PRN flare\\nhydrocortisone 2.5% topical cream: Apply a thin layer to AA of face BID PRN flare\\nbetamethasone dipropionate 0.05% lotion: Apply a few scattered drops to the scalp QHS\\nketoconazole 2% shampoo: Wash scalp qoday allow to sit 3-5 minutes before rinsing

## 2022-12-12 NOTE — PROCEDURE: MEDICATION COUNSELING
Cell Phone/PDA (specify)/Clothing Doxycycline Pregnancy And Lactation Text: This medication is Pregnancy Category D and not consider safe during pregnancy. It is also excreted in breast milk but is considered safe for shorter treatment courses.

## 2022-12-19 ENCOUNTER — TELEPHONE (OUTPATIENT)
Dept: SCHEDULING | Facility: CLINIC | Age: 72
End: 2022-12-19
Payer: COMMERCIAL

## 2022-12-19 NOTE — TELEPHONE ENCOUNTER
Pt calling to find out a referral was send over by PCP office. Please call pt if not received [phone]

## 2022-12-22 ENCOUNTER — OFFICE VISIT (OUTPATIENT)
Dept: CARDIOLOGY | Facility: CLINIC | Age: 72
End: 2022-12-22
Payer: COMMERCIAL

## 2022-12-22 VITALS
SYSTOLIC BLOOD PRESSURE: 124 MMHG | HEIGHT: 70 IN | WEIGHT: 174.6 LBS | DIASTOLIC BLOOD PRESSURE: 72 MMHG | HEART RATE: 66 BPM | RESPIRATION RATE: 16 BRPM | BODY MASS INDEX: 25 KG/M2

## 2022-12-22 DIAGNOSIS — I35.0 NONRHEUMATIC AORTIC VALVE STENOSIS: Primary | ICD-10-CM

## 2022-12-22 PROCEDURE — 93000 ELECTROCARDIOGRAM COMPLETE: CPT | Performed by: INTERNAL MEDICINE

## 2022-12-22 PROCEDURE — 99214 OFFICE O/P EST MOD 30 MIN: CPT | Performed by: INTERNAL MEDICINE

## 2022-12-22 PROCEDURE — 3008F BODY MASS INDEX DOCD: CPT | Performed by: INTERNAL MEDICINE

## 2022-12-22 ASSESSMENT — ENCOUNTER SYMPTOMS
HEMATOLOGIC/LYMPHATIC NEGATIVE: 1
PSYCHIATRIC NEGATIVE: 1
ROS SKIN COMMENTS: ECZEMA
MUSCULOSKELETAL NEGATIVE: 1
NEUROLOGICAL NEGATIVE: 1
EYES NEGATIVE: 1
GASTROINTESTINAL NEGATIVE: 1
CONSTITUTIONAL NEGATIVE: 1
ENDOCRINE NEGATIVE: 1
CARDIOVASCULAR NEGATIVE: 1
RESPIRATORY NEGATIVE: 1

## 2022-12-22 NOTE — PROGRESS NOTES
Chief Complaint: I saw Dieter in the office today on a routine visit for his hypertension and hyperlipidemia and mild valvular heart disease.  He is hemodynamically stable and denies any form of chest discomfort or shortness of breath with exertion, anxiety, cold weather, postprandially, at rest, or nocturnally.  He denies exertional dyspnea, proximal nocturnal dyspnea, orthopnea, palpitations, syncope, ankle edema, he has rare nocturia.       Medications:  Current Outpatient Medications   Medication Sig Dispense Refill   • amLODIPine (NORVASC) 5 mg tablet Take 1 tablet (5 mg total) by mouth daily. 90 tablet 3   • atorvastatin (LIPITOR) 20 mg tablet Take 20 mg by mouth once daily.     • azelastine (ASTELIN) 137 mcg (0.1 %) nasal spray Administer 2 sprays into each nostril 2 (two) times a day.     • cholecalciferol, vitamin D3, 100 mcg (4,000 unit) tablet Take 4,000 Units by mouth daily.     • EPINEPHrine (EPIPEN) 0.3 mg/0.3 mL injection syringe Inject into the thigh as needed.     • famotidine (PEPCID) 40 mg tablet Take 20 mg by mouth nightly.      • ketoconazole (NIZORAL) 2 % shampoo Apply topically as needed.     • multivitamin tablet Take by mouth daily.     • tamsulosin (FLOMAX) 0.4 mg capsule Take 0.4 mg by mouth once daily.     • valsartan (DIOVAN) 80 mg tablet Take 1 tablet (80 mg total) by mouth daily. 90 tablet 3   • WIXELA INHUB 500-50 mcg/dose diskus inhaler Inhale 2 puffs daily.     • cycloSPORINE (RESTASIS) 0.05 % ophthalmic emulsion Administer 1 drop into both eyes 2 (two) times a day.       No current facility-administered medications for this visit.       Review of Systems:  Review of Systems   Constitutional: Negative.   HENT: Negative.    Eyes: Negative.    Cardiovascular: Negative.    Respiratory: Negative.    Endocrine: Negative.    Hematologic/Lymphatic: Negative.    Skin: Positive for rash.        eczema   Musculoskeletal: Negative.    Gastrointestinal: Negative.    Genitourinary:  Negative.    Neurological: Negative.    Psychiatric/Behavioral: Negative.    Allergic/Immunologic: Positive for environmental allergies.       Physical Exam:  Vitals:    12/22/22 1019   BP: 124/72   Pulse: 66   Resp: 16     Physical Exam  Constitutional:       Appearance: He is well-developed and well-nourished.   HENT:      Head: Normocephalic and atraumatic.   Eyes:      Extraocular Movements: EOM normal.      Conjunctiva/sclera: Conjunctivae normal.      Pupils: Pupils are equal, round, and reactive to light.   Cardiovascular:      Rate and Rhythm: Normal rate and regular rhythm.      Pulses: Intact distal pulses.      Heart sounds: Murmur (MR AR) heard.   Pulmonary:      Effort: Pulmonary effort is normal.      Breath sounds: Normal breath sounds.   Abdominal:      General: Bowel sounds are normal.      Palpations: Abdomen is soft.   Musculoskeletal:         General: Normal range of motion.      Cervical back: Normal range of motion and neck supple.   Skin:     General: Skin is warm, dry and intact.   Neurological:      Mental Status: He is alert and oriented to person, place, and time.      Motor: Motor strength is normal.      Deep Tendon Reflexes: Reflexes are normal and symmetric.   Psychiatric:         Mood and Affect: Mood and affect normal.         Speech: Speech normal.         Behavior: Behavior normal.         Thought Content: Thought content normal.         Cognition and Memory: Cognition and memory normal.         Judgment: Judgment normal.       EKG:SR leftward axis PRWP V1-4 ST-tabn no change    Assessment and Plan:  Impression:  Hypertension controlled.  Mild aortic regurgitation.  Mild mitral regurgitation.  Hypercholesterolemia.  Asthma.  Thoracic aortic dilatation no true aneurysm.  Recommendation: He is hemodynamically stable and his blood pressure is well controlled.  We will see him again in 5 months before I leave.  We will make arrangements for him to have a repeat CAT scan at that point.   Thank you for allowing us to see this very nice gentleman in the office today.    Domenico Orozco, DO

## 2022-12-22 NOTE — LETTER
December 22, 2022     Magali Ibanez DO  [address]  [place] [address]    Patient: Dieter Sahni  YOB: 1950  Date of Visit: 12/22/2022      Dear Dr. Ibanez:    Thank you for referring Dieter Sahni to me for evaluation. Below are my notes for this consultation.    If you have questions, please do not hesitate to call me. I look forward to following your patient along with you.         Sincerely,        Domenico Orozco DO        CC: No Recipients  Domenico Orozco DO  12/22/2022 11:12 AM  Signed      Chief Complaint: I saw Dieter in the office today on a routine visit for his hypertension and hyperlipidemia and mild valvular heart disease.  He is hemodynamically stable and denies any form of chest discomfort or shortness of breath with exertion, anxiety, cold weather, postprandially, at rest, or nocturnally.  He denies exertional dyspnea, proximal nocturnal dyspnea, orthopnea, palpitations, syncope, ankle edema, he has rare nocturia.       Medications:  Current Outpatient Medications   Medication Sig Dispense Refill   • amLODIPine (NORVASC) 5 mg tablet Take 1 tablet (5 mg total) by mouth daily. 90 tablet 3   • atorvastatin (LIPITOR) 20 mg tablet Take 20 mg by mouth once daily.     • azelastine (ASTELIN) 137 mcg (0.1 %) nasal spray Administer 2 sprays into each nostril 2 (two) times a day.     • cholecalciferol, vitamin D3, 100 mcg (4,000 unit) tablet Take 4,000 Units by mouth daily.     • EPINEPHrine (EPIPEN) 0.3 mg/0.3 mL injection syringe Inject into the thigh as needed.     • famotidine (PEPCID) 40 mg tablet Take 20 mg by mouth nightly.      • ketoconazole (NIZORAL) 2 % shampoo Apply topically as needed.     • multivitamin tablet Take by mouth daily.     • tamsulosin (FLOMAX) 0.4 mg capsule Take 0.4 mg by mouth once daily.     • valsartan (DIOVAN) 80 mg tablet Take 1 tablet (80 mg total) by mouth daily. 90 tablet 3   • WIXELA INHUB 500-50 mcg/dose diskus inhaler Inhale 2  puffs daily.     • cycloSPORINE (RESTASIS) 0.05 % ophthalmic emulsion Administer 1 drop into both eyes 2 (two) times a day.       No current facility-administered medications for this visit.       Review of Systems:  Review of Systems   Constitutional: Negative.   HENT: Negative.    Eyes: Negative.    Cardiovascular: Negative.    Respiratory: Negative.    Endocrine: Negative.    Hematologic/Lymphatic: Negative.    Skin: Positive for rash.        eczema   Musculoskeletal: Negative.    Gastrointestinal: Negative.    Genitourinary: Negative.    Neurological: Negative.    Psychiatric/Behavioral: Negative.    Allergic/Immunologic: Positive for environmental allergies.       Physical Exam:  Vitals:    12/22/22 1019   BP: 124/72   Pulse: 66   Resp: 16     Physical Exam  Constitutional:       Appearance: He is well-developed and well-nourished.   HENT:      Head: Normocephalic and atraumatic.   Eyes:      Extraocular Movements: EOM normal.      Conjunctiva/sclera: Conjunctivae normal.      Pupils: Pupils are equal, round, and reactive to light.   Cardiovascular:      Rate and Rhythm: Normal rate and regular rhythm.      Pulses: Intact distal pulses.      Heart sounds: Murmur (MR AR) heard.   Pulmonary:      Effort: Pulmonary effort is normal.      Breath sounds: Normal breath sounds.   Abdominal:      General: Bowel sounds are normal.      Palpations: Abdomen is soft.   Musculoskeletal:         General: Normal range of motion.      Cervical back: Normal range of motion and neck supple.   Skin:     General: Skin is warm, dry and intact.   Neurological:      Mental Status: He is alert and oriented to person, place, and time.      Motor: Motor strength is normal.      Deep Tendon Reflexes: Reflexes are normal and symmetric.   Psychiatric:         Mood and Affect: Mood and affect normal.         Speech: Speech normal.         Behavior: Behavior normal.         Thought Content: Thought content normal.         Cognition and  Memory: Cognition and memory normal.         Judgment: Judgment normal.       EKG:SR leftward axis PRWP V1-4 ST-tabn no change    Assessment and Plan:  Impression:  Hypertension controlled.  Mild aortic regurgitation.  Mild mitral regurgitation.  Hypercholesterolemia.  Asthma.  Thoracic aortic dilatation no true aneurysm.  Recommendation: He is hemodynamically stable and his blood pressure is well controlled.  We will see him again in 5 months before I leave.  We will make arrangements for him to have a repeat CAT scan at that point.  Thank you for allowing us to see this very nice gentleman in the office today.    Domenico Orozco, DO

## 2023-02-06 ENCOUNTER — TELEPHONE (OUTPATIENT)
Dept: SCHEDULING | Facility: CLINIC | Age: 73
End: 2023-02-06

## 2023-02-06 NOTE — TELEPHONE ENCOUNTER
Pt is having colonoscopy on 2/17 w/ Dr. Osmin Stephens.    Dr. Stephens is requesting EKG w/ tracing from 12/22/22.     Please fax to #[phone].    Ty.

## 2023-03-23 RX ORDER — AMLODIPINE BESYLATE 5 MG/1
5 TABLET ORAL DAILY
Qty: 90 TABLET | Refills: 3 | Status: SHIPPED | OUTPATIENT
Start: 2023-03-23 | End: 2023-11-30

## 2023-05-01 RX ORDER — VALSARTAN 80 MG/1
80 TABLET ORAL DAILY
Qty: 90 TABLET | Refills: 3 | Status: SHIPPED | OUTPATIENT
Start: 2023-05-01 | End: 2023-11-30 | Stop reason: SDUPTHER

## 2023-05-09 LAB
BUN SERPL-MCNC: 15 MG/DL (ref 8–27)
CREAT SERPL-MCNC: 1.02 MG/DL (ref 0.76–1.27)
EGFRCR SERPLBLD CKD-EPI 2021: 78 ML/MIN/1.73

## 2023-05-09 NOTE — TELEPHONE ENCOUNTER
Pt called to make sure he does not need a referral for 5/19 appt     Please call pt at [phone]

## 2023-05-16 ENCOUNTER — TELEPHONE (OUTPATIENT)
Dept: CARDIOTHORACIC SURGERY | Facility: CLINIC | Age: 73
End: 2023-05-16
Payer: COMMERCIAL

## 2023-05-16 NOTE — TELEPHONE ENCOUNTER
Please precert CT ANGIOGRAPHY CHEST WITH AND WITHOUT IV CONTRAST  To be done at [facility] [phone]

## 2023-05-16 NOTE — TELEPHONE ENCOUNTER
There was a staff message sent on this pt. I already spoke to him and he stated he already had this test done last Friday at [facility].

## 2023-05-16 NOTE — TELEPHONE ENCOUNTER
Order ID: [id number]       Authorized for CTA Chest(19170)    Approval Valid Through: 04/12/2023 - 07/10/2023     Approved for [facility]. (Where pt is capitated per insurance)     Auth was already in system

## 2023-05-16 NOTE — TELEPHONE ENCOUNTER
Patient has appt to see Genie on 5/9/2023.  He needs a CTA prior and I do not see one scheduled.  I also do not see one from an outside institution.  Please schedule CTA and if necessary, reschedule his OV.

## 2023-05-19 ENCOUNTER — OFFICE VISIT (OUTPATIENT)
Dept: CARDIOTHORACIC SURGERY | Facility: CLINIC | Age: 73
End: 2023-05-19
Payer: COMMERCIAL

## 2023-05-19 VITALS
HEART RATE: 98 BPM | RESPIRATION RATE: 16 BRPM | DIASTOLIC BLOOD PRESSURE: 66 MMHG | WEIGHT: 172 LBS | SYSTOLIC BLOOD PRESSURE: 114 MMHG | OXYGEN SATURATION: 95 % | BODY MASS INDEX: 24.62 KG/M2 | HEIGHT: 70 IN | TEMPERATURE: 97.3 F

## 2023-05-19 DIAGNOSIS — I71.9 AORTIC ANEURYSM WITHOUT RUPTURE, UNSPECIFIED PORTION OF AORTA (CMS/HCC): Primary | ICD-10-CM

## 2023-05-19 PROCEDURE — 99214 OFFICE O/P EST MOD 30 MIN: CPT | Performed by: NURSE PRACTITIONER

## 2023-05-19 PROCEDURE — 3008F BODY MASS INDEX DOCD: CPT | Performed by: NURSE PRACTITIONER

## 2023-05-19 ASSESSMENT — ENCOUNTER SYMPTOMS
DIFFICULTY URINATING: 0
AGITATION: 0
BACK PAIN: 0
CONFUSION: 0
COUGH: 0
WEAKNESS: 0
FATIGUE: 0
DIZZINESS: 0
PALPITATIONS: 0
SHORTNESS OF BREATH: 1
CHEST TIGHTNESS: 0
NERVOUS/ANXIOUS: 0
NAUSEA: 0
ARTHRALGIAS: 0
WOUND: 0

## 2023-05-19 NOTE — LETTER
May 19, 2023     Patient: Dieter Sahni  YOB: 1950  Date of Visit: 5/19/2023    To Whom it May Concern:    Dieter Sahni was seen in my clinic on 5/19/2023 at 10:00 am. Please excuse Dieter for his absence from work on this morning and he is cleared from a cardiology perspective to go back to work today .    If you have any questions or concerns, please don't hesitate to call.         Sincerely,               JARON Oliva        CC: No Recipients

## 2023-05-19 NOTE — PROGRESS NOTES
Cardiac Surgery    Reason for consultation:Dilated ascending aorta, aortic insufficiency    HPI  Patient is a 72 y.o. male here for follow up of a Dilated ascending aorta as well as aortic insufficiency. He was  referred by Dr. Orozco.      He has been followed by Dr. Orozco for his cardiovascular care and an echo was done that revealed mild aortic insufficiency and a dilated ascending aorta.        He currently reports he is feeling well, tries not to lift more than 40 pounds but difficult with his line of work         He denies a family history of aortic dissection, chest pain or back pain.  PMH: HTN    PCP:  Magali Ibanez  Cardiologist: Dr. Orozco    Medical History:   Past Medical History:   Diagnosis Date   • Acute bronchitis 01/2022   • Aneurysm (CMS/HCC)    • Asthma    • Enlarged prostate    • Lipid disorder    • SOB (shortness of breath)        Surgical History:   Past Surgical History:   Procedure Laterality Date   • HERNIA REPAIR  03/2019       Allergies: Latex, Nut - unspecified, and Penicillins      Current Outpatient Medications:   •  amLODIPine (NORVASC) 5 mg tablet, Take 1 tablet (5 mg total) by mouth daily., Disp: 90 tablet, Rfl: 3  •  atorvastatin (LIPITOR) 20 mg tablet, Take 20 mg by mouth once daily., Disp: , Rfl:   •  azelastine (ASTELIN) 137 mcg (0.1 %) nasal spray, Administer 2 sprays into each nostril 2 (two) times a day., Disp: , Rfl:   •  cholecalciferol, vitamin D3, 100 mcg (4,000 unit) tablet, Take 4,000 Units by mouth daily., Disp: , Rfl:   •  cycloSPORINE (RESTASIS) 0.05 % ophthalmic emulsion, Administer 1 drop into both eyes 2 (two) times a day., Disp: , Rfl:   •  EPINEPHrine (EPIPEN) 0.3 mg/0.3 mL injection syringe, Inject into the thigh as needed., Disp: , Rfl:   •  ketoconazole (NIZORAL) 2 % shampoo, Apply topically as needed., Disp: , Rfl:   •  multivitamin tablet, Take by mouth daily., Disp: , Rfl:   •  tamsulosin (FLOMAX) 0.4 mg capsule, Take 0.4 mg by mouth once daily.,  "Disp: , Rfl:   •  valsartan (DIOVAN) 80 mg tablet, Take 1 tablet (80 mg total) by mouth daily., Disp: 90 tablet, Rfl: 3  •  WIXELA INHUB 500-50 mcg/dose diskus inhaler, Inhale 2 puffs daily., Disp: , Rfl:   •  famotidine (PEPCID) 40 mg tablet, Take 20 mg by mouth nightly. , Disp: , Rfl:       Social History:   Social History     Socioeconomic History   • Marital status:    Tobacco Use   • Smoking status: Former     Packs/day: 0.25     Years: 12.00     Pack years: 3.00     Types: Cigarettes     Quit date: 2021     Years since quittin.5   • Smokeless tobacco: Never   Substance and Sexual Activity   • Alcohol use: Yes     Alcohol/week: 14.0 standard drinks of alcohol     Types: 14 Glasses of wine per week   • Drug use: Defer   • Sexual activity: Defer       Family History:   Family History   Problem Relation Age of Onset   • Kidney disease Biological Mother    • Alzheimer's disease Biological Mother    • Hypertension Biological Father    • Diabetes Biological Father        Review of Systems  Review of Systems   Constitutional: Negative for fatigue.   HENT: Negative for postnasal drip.    Eyes: Negative for visual disturbance.   Respiratory: Positive for shortness of breath. Negative for cough and chest tightness.    Cardiovascular: Positive for leg swelling. Negative for chest pain and palpitations.   Gastrointestinal: Negative for nausea.   Genitourinary: Negative for difficulty urinating.   Musculoskeletal: Negative for arthralgias, back pain and gait problem.   Skin: Negative for wound.   Neurological: Negative for dizziness, syncope and weakness.   Psychiatric/Behavioral: Negative for agitation, behavioral problems and confusion. The patient is not nervous/anxious.    All other systems reviewed and are negative.      Objective     Vital Signs for the last 24 hours:  Visit Vitals  /66   Pulse 98   Temp 36.3 °C (97.3 °F) (Tympanic)   Resp 16   Ht 1.778 m (5' 10\")   Wt 78 kg (172 lb)   SpO2 95% "   BMI 24.68 kg/m²       Physical Exam    Cardiac Imaging    TRANSTHORACIC ECHO (TTE) COMPLETE 03/31/2022    Interpretation Summary  · Normal-sized LV. Normal LV systolic function. Estimated EF 60%. Normal LV septal wall motion. No regional wall motion abnormalities. Normal LV wall thickness. Grade II LV diastolic dysfunction. LV diastolic inflow pattern suggestive of elevated LV filling pressure.  · Normal-sized RV. Normal RV systolic function.  · Mildly dilated LA. No patent foramen ovale present as seen in the LA. Intact LA septum present.  · Normal-sized RA.  · Aortic root sclerotic and calcificied. Sinuses of Valsalva calcified. Ascending aorta calcified. Aortic arch grossly normal. Descending aorta difficult to visualize. Mild dilatation of the aorta at the sinuses of Valsalva. Aortic aneurysm present in the aortic root (sinus level).  · Tricuspid aortic valve. Mild aortic valve regurgitation with an eccentrically directed jet. Mild aortic valve stenosis. Study suggestive of low gradient aortic valve stenosis.  · Grossly normal leaflet structure of the mitral valve.  · Mild mitral valve regurgitation. The jet is centrally directed.  · Tricuspid valve structure is grossly normal.  · Pulmonic valve not well visualized.  · IVC is a small caliber vessel (<1.7cm). IVC collapses >50% during inspiration.  · Normal pericardial structure. No evidence of pericardial effusion. No cardiac tamponade.    The left ventricle is normal in dimension and motion.  The ejection fraction is 60%.  There is diastolic dysfunction type II.  The left atrium is mildly enlarged.  The mitral valve is grossly normal.  There is mild mitral valve annular calcification.  There is mild mitral regurgitation.  The right ventricle is normal in dimension and motion.  The right atrium is normal dimension.  The tricuspid valve is grossly normal.  The aortic valve is 3 cusps there is aortic regurgitation I believe is mild.  The aortic root is calcified  and dilated at 4.4 cm at the sinus of Valsalva.  The inferior vena cava is normal in dimension and motion.  3/31/22 CTA chest  _            Assessment/Plan     Dilated descending Aorta    Assessment:  CTA personally reviewed by myself. Mild aortic insufficiency with a normal EF.   Difficult to measure as it is not uploadable to [employer].  Ascending aorta measures 3.8 cm at the level of the pulm artery and the aortic root measures 4.3 cm .    Aortic Height Index Calculation: 2.50-2.57 The patient is in the moderate  risk category for risk of dissection or rupture.   Aortic Size Index (size/body surface area): 2.2 The patient is in the low risk category for risk of dissection or rupture.   Ratio Surface/Height: 8.17  2010 ACC/AHA/AATS Guidelines on aortic disease: no recommendations  2014 ACC/AHA guidelines for valvular disease: no recommendations  2014 CCS guidelines for thoracic aortic disease: no recommendations       Plan:   No indication for surgical intervention at this time.     We will need a CTA of the chest repeated in 1 year  Needs updated ECHO    Recommended aggressive hypertension management.     The following recommendations were made to the patient: Do not lift in excess of 40 pounds.  Heavy lifting or straining causes brief spikes in blood pressure, which we are hoping to avoid.  The patient was also counseled on exercise parameters. Patient was advised low impact cardiovascular exercise was ok and may do light weight lifting.  We have recommended that the patient monitor their heart rate during exercise.  The patient was also counseled on the importance of maintaining good blood pressure control and was advised not to exceed 130 systolic. Given the recent FDA safety statement regarding the use of Fluoroquinolones in patients with aortic disease, they were advised to avoid this class of antibiotics when possible. In the unlikely event of sudden onset of chest pain or pain that radiates to the upper back,  they were advised to report to the closest ED and have our service notified.        Juany CRISTINA CRNP

## 2023-05-19 NOTE — PATIENT INSTRUCTIONS
You can try OTC claritin or zrytec     No lifting more than 40 pounds at a time     Please get an ECHO with Dr Orozco     CT chest in 1 year

## 2023-05-23 NOTE — TELEPHONE ENCOUNTER
Pt called to make sure that referral has been obtained for 5/19 OV with Dr. Orozco.    Pt can be reached at [phone]

## 2023-05-25 ENCOUNTER — TELEPHONE (OUTPATIENT)
Dept: CARDIOLOGY | Facility: CLINIC | Age: 73
End: 2023-05-25

## 2023-05-25 ENCOUNTER — OFFICE VISIT (OUTPATIENT)
Dept: CARDIOLOGY | Facility: CLINIC | Age: 73
End: 2023-05-25
Payer: COMMERCIAL

## 2023-05-25 VITALS
WEIGHT: 173 LBS | DIASTOLIC BLOOD PRESSURE: 70 MMHG | RESPIRATION RATE: 16 BRPM | SYSTOLIC BLOOD PRESSURE: 114 MMHG | HEIGHT: 70 IN | HEART RATE: 59 BPM | BODY MASS INDEX: 24.77 KG/M2

## 2023-05-25 DIAGNOSIS — I35.0 NONRHEUMATIC AORTIC VALVE STENOSIS: Primary | ICD-10-CM

## 2023-05-25 PROCEDURE — 93000 ELECTROCARDIOGRAM COMPLETE: CPT | Performed by: INTERNAL MEDICINE

## 2023-05-25 PROCEDURE — 99214 OFFICE O/P EST MOD 30 MIN: CPT | Performed by: INTERNAL MEDICINE

## 2023-05-25 PROCEDURE — 3008F BODY MASS INDEX DOCD: CPT | Performed by: INTERNAL MEDICINE

## 2023-05-25 ASSESSMENT — ENCOUNTER SYMPTOMS
FEVER: 1
PSYCHIATRIC NEGATIVE: 1
MUSCULOSKELETAL NEGATIVE: 1
NEUROLOGICAL NEGATIVE: 1
HEMATOLOGIC/LYMPHATIC NEGATIVE: 1
ENDOCRINE NEGATIVE: 1
GASTROINTESTINAL NEGATIVE: 1
EYES NEGATIVE: 1
RESPIRATORY NEGATIVE: 1

## 2023-05-25 NOTE — PROGRESS NOTES
Chief Complaint: I saw Dieter in the office today on a routine visit for his hypertension and thoracic aortic aneurysm and lipid disorder.  He is doing quite well he occasionally has sharp breast pain which he was told it is reflux.  He denies any form of chest discomfort or shortness of breath with exertion anxiety cold weather postprandially or nocturnally.  He can climb a flight of stairs without getting short of breath, he denies proximal nocturnal dyspnea he denies orthopnea he denies palpitations syncope ankle edema or nocturia.       Medications:  Current Outpatient Medications   Medication Sig Dispense Refill   • amLODIPine (NORVASC) 5 mg tablet Take 1 tablet (5 mg total) by mouth daily. 90 tablet 3   • atorvastatin (LIPITOR) 20 mg tablet Take 20 mg by mouth once daily.     • azelastine (ASTELIN) 137 mcg (0.1 %) nasal spray Administer 2 sprays into each nostril 2 (two) times a day.     • cholecalciferol, vitamin D3, 100 mcg (4,000 unit) tablet Take 4,000 Units by mouth daily.     • cycloSPORINE (RESTASIS) 0.05 % ophthalmic emulsion Administer 1 drop into both eyes 2 (two) times a day.     • EPINEPHrine (EPIPEN) 0.3 mg/0.3 mL injection syringe Inject into the thigh as needed.     • ketoconazole (NIZORAL) 2 % shampoo Apply topically as needed.     • multivitamin tablet Take by mouth daily.     • tamsulosin (FLOMAX) 0.4 mg capsule Take 0.4 mg by mouth once daily.     • valsartan (DIOVAN) 80 mg tablet Take 1 tablet (80 mg total) by mouth daily. 90 tablet 3   • WIXELA INHUB 500-50 mcg/dose diskus inhaler Inhale 2 puffs daily.       No current facility-administered medications for this visit.       Review of Systems:  Review of Systems   Constitutional: Positive for fever.   HENT: Positive for congestion.    Eyes: Negative.    Cardiovascular: Positive for chest pain.   Respiratory: Negative.    Endocrine: Negative.    Hematologic/Lymphatic: Negative.    Skin: Negative.    Musculoskeletal: Negative.     Gastrointestinal: Negative.    Genitourinary: Negative.    Neurological: Negative.    Psychiatric/Behavioral: Negative.    Allergic/Immunologic: Positive for environmental allergies.       Physical Exam:  Vitals:    05/25/23 1012   BP: 114/70   Pulse: (!) 59   Resp: 16     Physical Exam  Constitutional:       Appearance: He is well-developed.   HENT:      Head: Normocephalic and atraumatic.   Eyes:      Conjunctiva/sclera: Conjunctivae normal.      Pupils: Pupils are equal, round, and reactive to light.   Cardiovascular:      Rate and Rhythm: Normal rate and regular rhythm.      Pulses: Intact distal pulses.      Heart sounds: Normal heart sounds.   Pulmonary:      Effort: Pulmonary effort is normal.      Breath sounds: Normal breath sounds.   Abdominal:      General: Bowel sounds are normal.      Palpations: Abdomen is soft.   Musculoskeletal:         General: Normal range of motion.      Cervical back: Normal range of motion and neck supple.   Skin:     General: Skin is warm and dry.   Neurological:      Mental Status: He is alert and oriented to person, place, and time.      Deep Tendon Reflexes: Reflexes are normal and symmetric.   Psychiatric:         Speech: Speech normal.         Behavior: Behavior normal.         Thought Content: Thought content normal.         Judgment: Judgment normal.       EKG: SR otherwise normal    Assessment and Plan:  Impression:  Hypertension controlled.  Thoracic aortic aneurysm stable.  Hyperlipidemia.  Recommendation:  He appears to be hemodynamically stable I did not have to make any changes in his medications.  We are going to transfer his care to Dr. Vu Muñoz who is going to take my place.  He will see him in 6 months time and repeat his echocardiogram.  [facility].  Thank you for allowing us to see this very nice gentleman and take care of him.  Thank you Mago for all your support through the  years.    Domenico Orozco, DO

## 2023-05-25 NOTE — TELEPHONE ENCOUNTER
Garcias is having a echo in the [facility] Office on 11/29/23. His insurance is Replaced by [facility] id# is [id number]. Please do a pre cert Thanks

## 2023-05-25 NOTE — LETTER
May 25, 2023     Magali Ibanez DO  [address] [facility] [address]  [facility] [address]    Patient: Dieter Sahni  YOB: 1950  Date of Visit: 5/25/2023      Dear Dr. Ibanez:    Thank you for referring Dieter Sahni to me for evaluation. Below are my notes for this consultation.    If you have questions, please do not hesitate to call me. I look forward to following your patient along with you.         Sincerely,        Domenico Orozco DO        CC: No Recipients    Domenico Orozco DO  5/25/2023 10:42 AM  Signed      Chief Complaint: I saw Dieter in the office today on a routine visit for his hypertension and thoracic aortic aneurysm and lipid disorder.  He is doing quite well he occasionally has sharp breast pain which he was told it is reflux.  He denies any form of chest discomfort or shortness of breath with exertion anxiety cold weather postprandially or nocturnally.  He can climb a flight of stairs without getting short of breath, he denies proximal nocturnal dyspnea he denies orthopnea he denies palpitations syncope ankle edema or nocturia.       Medications:  Current Outpatient Medications   Medication Sig Dispense Refill   • amLODIPine (NORVASC) 5 mg tablet Take 1 tablet (5 mg total) by mouth daily. 90 tablet 3   • atorvastatin (LIPITOR) 20 mg tablet Take 20 mg by mouth once daily.     • azelastine (ASTELIN) 137 mcg (0.1 %) nasal spray Administer 2 sprays into each nostril 2 (two) times a day.     • cholecalciferol, vitamin D3, 100 mcg (4,000 unit) tablet Take 4,000 Units by mouth daily.     • cycloSPORINE (RESTASIS) 0.05 % ophthalmic emulsion Administer 1 drop into both eyes 2 (two) times a day.     • EPINEPHrine (EPIPEN) 0.3 mg/0.3 mL injection syringe Inject into the thigh as needed.     • ketoconazole (NIZORAL) 2 % shampoo Apply topically as needed.     • multivitamin tablet Take by mouth daily.     • tamsulosin (FLOMAX) 0.4 mg capsule Take 0.4 mg by mouth once daily.     •  valsartan (DIOVAN) 80 mg tablet Take 1 tablet (80 mg total) by mouth daily. 90 tablet 3   • WIXELA INHUB 500-50 mcg/dose diskus inhaler Inhale 2 puffs daily.       No current facility-administered medications for this visit.       Review of Systems:  Review of Systems   Constitutional: Positive for fever.   HENT: Positive for congestion.    Eyes: Negative.    Cardiovascular: Positive for chest pain.   Respiratory: Negative.    Endocrine: Negative.    Hematologic/Lymphatic: Negative.    Skin: Negative.    Musculoskeletal: Negative.    Gastrointestinal: Negative.    Genitourinary: Negative.    Neurological: Negative.    Psychiatric/Behavioral: Negative.    Allergic/Immunologic: Positive for environmental allergies.       Physical Exam:  Vitals:    05/25/23 1012   BP: 114/70   Pulse: (!) 59   Resp: 16     Physical Exam  Constitutional:       Appearance: He is well-developed.   HENT:      Head: Normocephalic and atraumatic.   Eyes:      Conjunctiva/sclera: Conjunctivae normal.      Pupils: Pupils are equal, round, and reactive to light.   Cardiovascular:      Rate and Rhythm: Normal rate and regular rhythm.      Pulses: Intact distal pulses.      Heart sounds: Normal heart sounds.   Pulmonary:      Effort: Pulmonary effort is normal.      Breath sounds: Normal breath sounds.   Abdominal:      General: Bowel sounds are normal.      Palpations: Abdomen is soft.   Musculoskeletal:         General: Normal range of motion.      Cervical back: Normal range of motion and neck supple.   Skin:     General: Skin is warm and dry.   Neurological:      Mental Status: He is alert and oriented to person, place, and time.      Deep Tendon Reflexes: Reflexes are normal and symmetric.   Psychiatric:         Speech: Speech normal.         Behavior: Behavior normal.         Thought Content: Thought content normal.         Judgment: Judgment normal.       EKG: SR otherwise normal    Assessment and Plan:  Impression:  Hypertension  controlled.  Thoracic aortic aneurysm stable.  Hyperlipidemia.  Recommendation:  He appears to be hemodynamically stable I did not have to make any changes in his medications.  We are going to transfer his care to Dr. Vu Muñoz who is going to take my place.  He will see him in 6 months time and repeat his echocardiogram.  [facility] thoracic aortic [facility].  Thank you for allowing us to see this very nice gentleman and take care of him.  Thank you Mago for all your support through the years.    Domenico Orozco, DO

## 2023-11-20 ENCOUNTER — TELEPHONE (OUTPATIENT)
Dept: SCHEDULING | Facility: CLINIC | Age: 73
End: 2023-11-20
Payer: COMMERCIAL

## 2023-11-29 ENCOUNTER — HOSPITAL ENCOUNTER (OUTPATIENT)
Dept: CARDIOLOGY | Facility: CLINIC | Age: 73
Discharge: HOME | End: 2023-11-29
Attending: INTERNAL MEDICINE
Payer: COMMERCIAL

## 2023-11-29 VITALS
HEIGHT: 70 IN | SYSTOLIC BLOOD PRESSURE: 124 MMHG | BODY MASS INDEX: 24.48 KG/M2 | DIASTOLIC BLOOD PRESSURE: 60 MMHG | WEIGHT: 171 LBS

## 2023-11-29 DIAGNOSIS — I35.0 NONRHEUMATIC AORTIC VALVE STENOSIS: ICD-10-CM

## 2023-11-29 PROCEDURE — 93306 TTE W/DOPPLER COMPLETE: CPT | Performed by: STUDENT IN AN ORGANIZED HEALTH CARE EDUCATION/TRAINING PROGRAM

## 2023-11-30 ENCOUNTER — OFFICE VISIT (OUTPATIENT)
Dept: CARDIOLOGY | Facility: CLINIC | Age: 73
End: 2023-11-30
Payer: COMMERCIAL

## 2023-11-30 VITALS
RESPIRATION RATE: 18 BRPM | SYSTOLIC BLOOD PRESSURE: 108 MMHG | WEIGHT: 170.8 LBS | HEART RATE: 69 BPM | BODY MASS INDEX: 24.45 KG/M2 | DIASTOLIC BLOOD PRESSURE: 66 MMHG | HEIGHT: 70 IN

## 2023-11-30 DIAGNOSIS — I35.1 NONRHEUMATIC AORTIC VALVE INSUFFICIENCY: Primary | ICD-10-CM

## 2023-11-30 DIAGNOSIS — I10 HYPERTENSION, UNSPECIFIED TYPE: ICD-10-CM

## 2023-11-30 PROCEDURE — 99215 OFFICE O/P EST HI 40 MIN: CPT | Performed by: STUDENT IN AN ORGANIZED HEALTH CARE EDUCATION/TRAINING PROGRAM

## 2023-11-30 PROCEDURE — 3074F SYST BP LT 130 MM HG: CPT | Performed by: STUDENT IN AN ORGANIZED HEALTH CARE EDUCATION/TRAINING PROGRAM

## 2023-11-30 PROCEDURE — 3008F BODY MASS INDEX DOCD: CPT | Performed by: STUDENT IN AN ORGANIZED HEALTH CARE EDUCATION/TRAINING PROGRAM

## 2023-11-30 PROCEDURE — 93000 ELECTROCARDIOGRAM COMPLETE: CPT | Performed by: STUDENT IN AN ORGANIZED HEALTH CARE EDUCATION/TRAINING PROGRAM

## 2023-11-30 PROCEDURE — 3078F DIAST BP <80 MM HG: CPT | Performed by: STUDENT IN AN ORGANIZED HEALTH CARE EDUCATION/TRAINING PROGRAM

## 2023-11-30 RX ORDER — VALSARTAN 160 MG/1
160 TABLET ORAL DAILY
Qty: 90 TABLET | Refills: 3 | Status: SHIPPED | OUTPATIENT
Start: 2023-11-30 | End: 2024-12-11

## 2023-11-30 RX ORDER — TRIAMCINOLONE ACETONIDE 1 MG/G
CREAM TOPICAL
COMMUNITY
Start: 2023-09-16

## 2023-11-30 NOTE — PROGRESS NOTES
Vu Muñoz MD  Cardiology    [facility]    [facility]  [facility] [address]  [address] [place] [address]    TEL  [phone]  [email or web address]/[email or web address]     23     It was my pleasure to see Dieter Sahni at the [facility] today.     Dieter Sahni is a 73 y.o. male with a history of hypertension, top normal thoracic aorta, hyperlipidemia, and BPH who presents for follow-up.    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 Has stable SOB with slightly increased recovery times. No chest pain. No palpitations. Minimal ankle edema. No orthopnea. No pre-syncope. Occasionally checking BP at home which are normal.       Social History:  Social History     Tobacco Use   • Smoking status: Former     Packs/day: 0.25     Years: 12.00     Additional pack years: 0.00     Total pack years: 3.00     Types: Cigarettes     Quit date: 2021     Years since quittin.0   • Smokeless tobacco: Never   Vaping Use   • Vaping Use: Never used   Substance Use Topics   • Alcohol use: Yes     Alcohol/week: 14.0 standard drinks of alcohol     Types: 14 Glasses of wine per week   • Drug use: Defer       Family History:    Family History   Problem Relation Age of Onset   • Kidney disease Biological Mother    • Alzheimer's disease Biological Mother    • Hypertension Biological Father    • Diabetes Biological Father          Review of Systems: A complete 14-point review of systems is negative, except as noted in the HPI.    Exam:  Objective   Vitals:    23 1445   BP: 108/66   Pulse: 69   Resp: 18     Body mass index is 24.51 kg/m².  Wt Readings from Last 3 Encounters:   23 77.5 kg (170 lb 12.8 oz)   23 77.6 kg (171 lb)   23 78.5 kg (173 lb)       Physical Exam  Constitutional:       Appearance: He is well-developed.   HENT:      Head: Atraumatic.   Eyes:      General: No scleral icterus.  Neck:       Vascular: No JVD.      Trachea: No tracheal deviation.   Cardiovascular:      Rate and Rhythm: Normal rate and regular rhythm.      Heart sounds: Normal heart sounds. No murmur heard.     No friction rub.   Pulmonary:      Effort: Pulmonary effort is normal. No respiratory distress.      Breath sounds: Normal breath sounds. No wheezing or rales.   Abdominal:      Palpations: Abdomen is soft.      Tenderness: There is no abdominal tenderness. There is no guarding.   Skin:     General: Skin is warm and dry.   Neurological:      Mental Status: He is alert.          Labs: Personally reviewed and discussed with the patient.  Notable for the following.   Lab Results   Component Value Date     04/15/2022    K 4.8 04/15/2022    BUN 15 05/08/2023    CREATININE 1.02 05/08/2023           ECG from today personally reviewed and discussed with the patient shows sinus rhythm with a PAC.    Assessment/Plan     Top normal ascending aorta size  The patient has undergone CT angiography in 9/20/2020, 4/2022, 5/2023, all of which have demonstrated no evidence of a significant ascending aortic aneurysm.  The greatest dimension that is noted on the report from the outside hospital is a diameter of 3.8 cm.  His transthoracic echocardiogram from 11/2023 measured a maximal diameter of 4 cm.     Aorta diameter to height ratio of 2.2 cm/m. Patient height ratio of <=2.43 cm/m indicates lower risk, 2.44-3.17 cm/m indicates moderate risk warranting close radiographic follow-up, 3.21-4.06 cm/m indicates high risk, and >=4.1 cm/m represents severe risk.     - Given the presence of top normal aortic size and the fact that he follows with the aortic clinic, we will change his amlodipine to guideline therapy with an ARB  - Would favor limiting repeat CT angiography given that his aorta has been stable in size and is not aneurysmal by report.    Hypertension  Blood pressure is well-controlled in the office today.  He is currently on amlodipine 5  mg.  We discussed that given the top normal aorta size, he may benefit from being on an ARB or beta-blocker more.  - Initiate valsartan 160 mg daily  - Stop amlodipine 5 mg daily.    Hyperlipidemia  Coronary artery calcifications noted on prior CT scan  He is currently without symptoms consistent with angina.  I do not have cholesterol panel for my review.  - Continue atorvastatin 20 mg  - Will plan to check a thorough lipid panel with APO B and LP(a) at her next visit.    It was my pleasure to visit with Dieter Sahni in clinic today.  Please do not hesitate to contact me with any questions.      Sincerely,    ________________  Vu Muñoz MD

## 2023-12-01 LAB
AORTIC ROOT ANNULUS - M-MODE: 3.3 CM
AORTIC ROOT ANNULUS: 4.4 CM
AORTIC VALVE MEAN VELOCITY: 1.04 M/S
AORTIC VALVE VELOCITY TIME INTEGRAL: 33 CM
ASCENDING AORTA: 4 CM
AV MEAN GRADIENT: 5 MMHG
AV PEAK GRADIENT: 9 MMHG
AV PEAK VELOCITY-S: 1.51 M/S
AV VALVE AREA INDEX: 1.47
AV VALVE AREA: 2.45 CM2
AV VELOCITY RATIO: 0.83
AVA (VTI): 2.89 CM2
BSA FOR ECHO PROCEDURE: 1.96 M2
CUSP SEPARATION: 2.1 CM
DOP CALC LVOT STROKE VOLUME: 95.2 CM3
E WAVE DECELERATION TIME: 285 MS
E/A RATIO: 0.7
E/E' RATIO: 14.7
E/LAT E' RATIO: 14
EDV (BP): 88 CM3
EF (A4C): 51.3 %
EF A2C: 53.1 %
EJECTION FRACTION: 50.7 %
EST RIGHT VENT SYSTOLIC PRESSURE BY TRICUSPID REGURGITATION JET: 21 MMHG
ESV (BP): 43.4 CM3
FRACTIONAL SHORTENING: 31.82 %
INTERVENTRICULAR SEPTUM: 0.87 CM
LA ESV (BP): 60.3 CM3
LA ESV INDEX (A2C): 32.19 CM3/M2
LA ESV INDEX (BP): 30.77 CM3/M2
LA/AORTA RATIO: 1.3
LAAS-AP2: 21.3 CM2
LAAS-AP4: 20.5 CM2
LAD 2D - M-MODE: 4.3 CM
LAD 2D: 4.3 CM
LALD A4C: 5.91 CM
LALD A4C: 6.66 CM
LAV-S: 63.1 CM3
LEFT ATRIUM VOLUME INDEX: 26.43 CM3/M2
LEFT ATRIUM VOLUME: 51.8 CM3
LEFT INTERNAL DIMENSION IN SYSTOLE: 3.6 CM (ref 2.78–4.21)
LEFT VENTRICLE DIASTOLIC VOLUME INDEX: 48.83 CM3/M2
LEFT VENTRICLE DIASTOLIC VOLUME: 95.7 CM3
LEFT VENTRICLE SYSTOLIC VOLUME INDEX: 23.83 CM3/M2
LEFT VENTRICLE SYSTOLIC VOLUME: 46.7 CM3
LEFT VENTRICULAR INTERNAL DIMENSION IN DIASTOLE: 5.28 CM (ref 4.72–6.55)
LEFT VENTRICULAR POSTERIOR WALL IN END DIASTOLE: 0.92 CM (ref 0.61–1.15)
LV DIASTOLIC VOLUME: 80.1 CM3
LV ESV (APICAL 2 CHAMBER): 37.6 CM3
LVAD-AP2: 27.5 CM2
LVAD-AP4: 29.9 CM2
LVAS-AP2: 17.2 CM2
LVAS-AP4: 20.3 CM2
LVEDVI(A2C): 40.87 CM3/M2
LVEDVI(BP): 44.9 CM3/M2
LVESVI(A2C): 19.18 CM3/M2
LVESVI(BP): 22.14 CM3/M2
LVLD-AP2: 8.01 CM
LVLD-AP4: 7.87 CM
LVLS-AP2: 6.84 CM
LVLS-AP4: 7.5 CM
LVOT 2D: 2.1 CM
LVOT A: 3.46 CM2
LVOT MG: 4 MMHG
LVOT MV: 0.96 M/S
LVOT PEAK VELOCITY: 1.36 M/S
LVOT PG: 7 MMHG
LVOT STROKE VOLUME INDEX: 48.57 ML/M2
LVOT VTI: 27.5 CM
MLH CV ECHO AVA INDEX VELOCITY RATIO: 1.2
MV E'TISSUE VEL-LAT: 0.06 M/S
MV E'TISSUE VEL-MED: 0.06 M/S
MV PEAK A VEL: 1.31 M/S
MV PEAK E VEL: 0.85 M/S
POSTERIOR WALL: 0.92 CM
PULMONARY REGURGITATION LATE DIASTOLIC VELOCITY: 1.15 M/S
PV PEAK GRADIENT: 4 MMHG
PV PV: 1.05 M/S
RAP: 5 MMHG
RVOT VMAX: 0.49 M/S
RVOT VTI: 11.8 CM
SEPTAL TISSUE DOPPLER FREE WALL LATE DIA VELOCITY (APICAL 4 CHAMBER VIEW): 0.22 M/S
TR MAX PG: 16 MMHG
TRICUSPID VALVE PEAK REGURGITATION VELOCITY: 2 M/S
Z-SCORE OF LEFT VENTRICULAR DIMENSION IN END DIASTOLE: -0.52
Z-SCORE OF LEFT VENTRICULAR DIMENSION IN END SYSTOLE: 0.41
Z-SCORE OF LEFT VENTRICULAR POSTERIOR WALL IN END DIASTOLE: 0.49

## 2023-12-26 ENCOUNTER — APPOINTMENT (RX ONLY)
Dept: URBAN - METROPOLITAN AREA CLINIC 28 | Facility: CLINIC | Age: 73
Setting detail: DERMATOLOGY
End: 2023-12-26

## 2023-12-26 DIAGNOSIS — L20.89 OTHER ATOPIC DERMATITIS: ICD-10-CM

## 2023-12-26 PROCEDURE — ? PRESCRIPTION MEDICATION MANAGEMENT

## 2023-12-26 PROCEDURE — ? PRESCRIPTION

## 2023-12-26 PROCEDURE — 99213 OFFICE O/P EST LOW 20 MIN: CPT

## 2023-12-26 PROCEDURE — ? COUNSELING

## 2023-12-26 RX ORDER — BETAMETHASONE DIPROPIONATE 0.5 MG/ML
1 LOTION TOPICAL QHS
Qty: 60 | Refills: 2 | Status: ERX

## 2023-12-26 RX ORDER — KETOCONAZOLE 20 MG/ML
1 SHAMPOO, SUSPENSION TOPICAL QOHS
Qty: 120 | Refills: 6 | Status: ERX

## 2023-12-26 RX ORDER — HYDROCORTISONE 25 MG/G
1 CREAM TOPICAL BID
Qty: 454 | Refills: 2 | Status: ERX

## 2023-12-26 RX ORDER — TRIAMCINOLONE ACETONIDE 1 MG/G
1 CREAM TOPICAL BID
Qty: 454 | Refills: 2 | Status: ERX

## 2023-12-26 ASSESSMENT — LOCATION DETAILED DESCRIPTION DERM
LOCATION DETAILED: LEFT DISTAL CALF
LOCATION DETAILED: RIGHT MEDIAL SUPERIOR CHEST
LOCATION DETAILED: LEFT SUPERIOR MEDIAL UPPER BACK
LOCATION DETAILED: LEFT VENTRAL DISTAL FOREARM
LOCATION DETAILED: RIGHT PROXIMAL DORSAL FOREARM
LOCATION DETAILED: LEFT PROXIMAL PRETIBIAL REGION
LOCATION DETAILED: RIGHT PROXIMAL PRETIBIAL REGION
LOCATION DETAILED: LEFT ANTERIOR PROXIMAL THIGH
LOCATION DETAILED: RIGHT VENTRAL PROXIMAL FOREARM
LOCATION DETAILED: RIGHT ANTERIOR PROXIMAL THIGH
LOCATION DETAILED: RIGHT DISTAL CALF
LOCATION DETAILED: RIGHT DISTAL POSTERIOR THIGH
LOCATION DETAILED: LEFT PROXIMAL DORSAL FOREARM
LOCATION DETAILED: LEFT DISTAL POSTERIOR THIGH

## 2023-12-26 ASSESSMENT — LOCATION SIMPLE DESCRIPTION DERM
LOCATION SIMPLE: CHEST
LOCATION SIMPLE: RIGHT POSTERIOR THIGH
LOCATION SIMPLE: RIGHT CALF
LOCATION SIMPLE: LEFT THIGH
LOCATION SIMPLE: LEFT POSTERIOR THIGH
LOCATION SIMPLE: LEFT CALF
LOCATION SIMPLE: LEFT UPPER BACK
LOCATION SIMPLE: RIGHT PRETIBIAL REGION
LOCATION SIMPLE: RIGHT FOREARM
LOCATION SIMPLE: LEFT FOREARM
LOCATION SIMPLE: LEFT PRETIBIAL REGION
LOCATION SIMPLE: RIGHT THIGH

## 2023-12-26 ASSESSMENT — LOCATION ZONE DERM
LOCATION ZONE: TRUNK
LOCATION ZONE: LEG
LOCATION ZONE: ARM

## 2023-12-26 ASSESSMENT — BSA ECZEMA: % BODY COVERED IN ECZEMA: 1

## 2023-12-26 ASSESSMENT — ITCH NUMERIC RATING SCALE: HOW SEVERE IS YOUR ITCHING?: 0

## 2023-12-26 NOTE — PROCEDURE: PRESCRIPTION MEDICATION MANAGEMENT
Render In Strict Bullet Format?: No
Detail Level: Zone
Continue Regimen: EpiCeram topical emulsion, extended release: Apply a thin layer to the body QDAY after shower\\ntriamcinolone acetonide 0.1% topical cream: Apply a thin layer BID to AA of body (not face) PRN flare\\nhydrocortisone 2.5% topical cream: Apply a thin layer to AA of face BID PRN flare\\nbetamethasone dipropionate 0.05% lotion: Apply a few scattered drops to the scalp QHS PRN flare\\nketoconazole 2% shampoo: Wash scalp qoday allow to sit 3-5 minutes before rinsing

## 2024-01-12 PROBLEM — E78.5 HYPERLIPIDEMIA, UNSPECIFIED: Status: ACTIVE | Noted: 2024-01-12

## 2024-01-12 PROBLEM — N13.8 BENIGN PROSTATIC HYPERPLASIA WITH URINARY OBSTRUCTION: Status: ACTIVE | Noted: 2017-05-01

## 2024-01-12 PROBLEM — I10 HYPERTENSION: Status: ACTIVE | Noted: 2020-08-01

## 2024-01-12 PROBLEM — I71.20 THORACIC AORTIC ANEURYSM WITHOUT RUPTURE (CMS/HCC): Status: ACTIVE | Noted: 2024-01-12

## 2024-01-12 PROBLEM — I71.20 THORACIC AORTIC ANEURYSM WITHOUT RUPTURE (CMS/HCC): Status: RESOLVED | Noted: 2024-01-12 | Resolved: 2024-01-12

## 2024-01-12 PROBLEM — N40.1 BENIGN PROSTATIC HYPERPLASIA WITH URINARY OBSTRUCTION: Status: ACTIVE | Noted: 2017-05-01

## 2024-04-26 ENCOUNTER — TELEPHONE (OUTPATIENT)
Dept: SCHEDULING | Age: 74
End: 2024-04-26
Payer: COMMERCIAL

## 2024-04-26 DIAGNOSIS — I71.9 AORTIC ANEURYSM WITHOUT RUPTURE, UNSPECIFIED PORTION OF AORTA (CMS/HCC): Primary | ICD-10-CM

## 2024-04-26 NOTE — TELEPHONE ENCOUNTER
Please enter new CTA chest and echo orders and labs. Patient has upcoming apt and the orders and labs we have will  soon.

## 2024-05-16 ENCOUNTER — HOSPITAL ENCOUNTER (OUTPATIENT)
Dept: CARDIOLOGY | Facility: HOSPITAL | Age: 74
Discharge: HOME | End: 2024-05-16
Attending: NURSE PRACTITIONER
Payer: COMMERCIAL

## 2024-05-16 VITALS
HEIGHT: 70 IN | DIASTOLIC BLOOD PRESSURE: 85 MMHG | BODY MASS INDEX: 24.34 KG/M2 | WEIGHT: 170 LBS | SYSTOLIC BLOOD PRESSURE: 122 MMHG

## 2024-05-16 DIAGNOSIS — I71.9 AORTIC ANEURYSM WITHOUT RUPTURE, UNSPECIFIED PORTION OF AORTA (CMS/HCC): ICD-10-CM

## 2024-05-16 LAB
AORTIC ROOT ANNULUS - M-MODE: 4.61 CM
AV REGURGITATION PRESSURE HALF TIME: 659.62 MS
BSA FOR ECHO PROCEDURE: 1.95 M2
CUSP SEPARATION: 2.57 CM
DOP CALC LVOT STROKE VOLUME: 79.98 ML
DOP CALC RVOT VTI: 10.97 CM
E WAVE DECELERATION TIME: 240.35 MS
E/A RATIO: 0.84
E/E' RATIO: 21.7
E/LAT E' RATIO: 13.73
EDV (BP): 98.06 ML
EF (A4C): 74.08 %
EF A2C: 76.11 %
EJECTION FRACTION: 76.04 %
ESV (BP): 23.5 ML
HEART RATE: 61 BPM
LA ESV (BP): 106.32 ML
LA ESV INDEX (A2C): 47.85 ML/M2
LA ESV INDEX (BP): 54.52 ML/M2
LAAS-AP2: 29.46 CM2
LAAS-AP4: 27.68 CM2
LAV-S: 93.3 ML
LEFT ATRIUM VOLUME INDEX: 50.77 ML/M2
LEFT ATRIUM VOLUME: 99 ML
LEFT VENTRICLE DIASTOLIC VOLUME INDEX: 56.97 ML/M2
LEFT VENTRICLE DIASTOLIC VOLUME: 111.1 ML
LEFT VENTRICLE SYSTOLIC VOLUME INDEX: 14.77 ML/M2
LEFT VENTRICLE SYSTOLIC VOLUME: 28.8 ML
LV DIASTOLIC VOLUME: 76.6 ML
LV ESV (APICAL 2 CHAMBER): 18.3 ML
LVCI: 2.27 LITERS PER MINUTE PER SQUARE METER
LVCO: 4.44 LITERS PER MINUTE
LVEDVI(A2C): 39.28 ML/M2
LVEDVI(BP): 50.29 ML/M2
LVESVI(A2C): 9.38 ML/M2
LVESVI(BP): 12.05 ML/M2
LVOT 2D: 2.14 CM
LVOT A: 3.58 CM2
LVOT MG: 2.83 MMHG
LVOT MV: 0.81 M/S
LVOT PEAK VELOCITY: 1.13 M/S
LVOT PG: 5.11 MMHG
LVOT STROKE VOLUME INDEX: 41.02 ML/M2
LVOT VTI: 22.25 CM
MV E'TISSUE VEL-LAT: 0.06 M/S
MV E'TISSUE VEL-MED: 0.04 M/S
MV PEAK A VEL: 1.05 M/S
MV PEAK E VEL: 0.88 M/S

## 2024-05-16 PROCEDURE — 93306 TTE W/DOPPLER COMPLETE: CPT

## 2024-05-16 PROCEDURE — 93306 TTE W/DOPPLER COMPLETE: CPT | Mod: 26 | Performed by: INTERNAL MEDICINE

## 2024-05-22 LAB
BUN SERPL-MCNC: 17 MG/DL (ref 8–27)
CREAT SERPL-MCNC: 0.78 MG/DL (ref 0.76–1.27)
EGFRCR SERPLBLD CKD-EPI 2021: 94 ML/MIN/1.73

## 2024-06-03 ENCOUNTER — OFFICE VISIT (OUTPATIENT)
Dept: CARDIOTHORACIC SURGERY | Facility: CLINIC | Age: 74
End: 2024-06-03
Payer: COMMERCIAL

## 2024-06-03 ENCOUNTER — HOSPITAL ENCOUNTER (OUTPATIENT)
Dept: RADIOLOGY | Facility: HOSPITAL | Age: 74
Discharge: HOME | End: 2024-06-03
Attending: NURSE PRACTITIONER
Payer: COMMERCIAL

## 2024-06-03 VITALS
OXYGEN SATURATION: 96 % | RESPIRATION RATE: 16 BRPM | HEART RATE: 63 BPM | DIASTOLIC BLOOD PRESSURE: 81 MMHG | SYSTOLIC BLOOD PRESSURE: 122 MMHG | WEIGHT: 171.6 LBS | HEIGHT: 70 IN | TEMPERATURE: 97.6 F | BODY MASS INDEX: 24.57 KG/M2

## 2024-06-03 DIAGNOSIS — I71.9 AORTIC ANEURYSM WITHOUT RUPTURE, UNSPECIFIED PORTION OF AORTA (CMS/HCC): ICD-10-CM

## 2024-06-03 DIAGNOSIS — I71.21 ANEURYSM OF ASCENDING AORTA WITHOUT RUPTURE (CMS/HCC): Primary | ICD-10-CM

## 2024-06-03 PROCEDURE — 99214 OFFICE O/P EST MOD 30 MIN: CPT | Performed by: NURSE PRACTITIONER

## 2024-06-03 PROCEDURE — 3079F DIAST BP 80-89 MM HG: CPT | Performed by: NURSE PRACTITIONER

## 2024-06-03 PROCEDURE — 3008F BODY MASS INDEX DOCD: CPT | Performed by: NURSE PRACTITIONER

## 2024-06-03 PROCEDURE — 3074F SYST BP LT 130 MM HG: CPT | Performed by: NURSE PRACTITIONER

## 2024-06-03 PROCEDURE — 71275 CT ANGIOGRAPHY CHEST: CPT

## 2024-06-03 PROCEDURE — 63600105 HC IODINE BASED CONTRAST: Mod: JZ | Performed by: NURSE PRACTITIONER

## 2024-06-03 RX ORDER — IOPAMIDOL 755 MG/ML
100 INJECTION, SOLUTION INTRAVASCULAR
Status: COMPLETED | OUTPATIENT
Start: 2024-06-03 | End: 2024-06-03

## 2024-06-03 RX ORDER — CETIRIZINE HYDROCHLORIDE 10 MG/1
10 TABLET ORAL DAILY
COMMUNITY

## 2024-06-03 RX ADMIN — IOPAMIDOL 100 ML: 755 INJECTION, SOLUTION INTRAVENOUS at 10:04

## 2024-06-03 ASSESSMENT — ENCOUNTER SYMPTOMS
PSYCHIATRIC NEGATIVE: 1
ORTHOPNEA: 0
PALPITATIONS: 0
SYNCOPE: 0
MUSCULOSKELETAL NEGATIVE: 1
NEAR-SYNCOPE: 0
PND: 0
DYSPNEA ON EXERTION: 1
NEUROLOGICAL NEGATIVE: 1
GASTROINTESTINAL NEGATIVE: 1
RESPIRATORY NEGATIVE: 1
CONSTITUTIONAL NEGATIVE: 1
IRREGULAR HEARTBEAT: 0
ENDOCRINE NEGATIVE: 1

## 2024-06-03 NOTE — PROGRESS NOTES
[facility]  Dr. Isaac Lao- System Chief Cardiothoracic Surgery  Jennifer SALMERON  [facility]  [phone]    [facility] Return Visit        Reason for visit:   Chief Complaint   Patient presents with    Follow-up      HPI    Mr Sahni is a 73 y.o. male here for follow up of a Dilated ascending aorta as well as aortic insufficiency.  He established care with Dr Forrester in 2022.  He was noted on TTE to have mild AI and a normal EF in 2022.     He has been followed by Dr. Orozco for his cardiovascular care and an echo was done that revealed mild aortic insufficiency and a dilated ascending aorta. Dr Orozco has since retired, so he has transitioned his care to Dr Muñoz. He has a past medical history of BPH and HTN.     He was last seen in aortic clinic on 5/19/2023.  At that office visit his Ascending aorta measured 3.8 cm at the level of the pulm artery and his aortic root measured 4.3 cm.  He was asked to have a CTA in one year.     He currently reports he is feeling well.  He has had no significant hospitalizations or changes to his medical history.  Works out twice a week a the [employer].  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  He states he gets mild SOB with exertion. He has reported significant cardiovascular complaints with activity. He does not lift 20 lbs.  He denies palpitations, dizziness, orthopnea, lower extremity edema, syncope or pre syncope.     He denies a family history of aortic dissection, he is a non smoker.       Cardiologist: nura Prior      Past Medical History:   Diagnosis Date    Acute bronchitis 01/2022    Aneurysm (CMS/HCC)     Asthma     Enlarged prostate     Lipid disorder     SOB (shortness of breath)      Past Surgical History:   Procedure Laterality Date    HERNIA REPAIR  03/2019     Nut - unspecified, Latex, and Penicillins  Current Outpatient  Medications   Medication Sig Dispense Refill    albuterol HFA 90 mcg/actuation inhaler Inhale 2 puffs every 6 (six) hours as needed for wheezing.      atorvastatin (LIPITOR) 20 mg tablet Take 40 mg by mouth once daily.      cetirizine (ZyrTEC) 10 mg tablet Take 10 mg by mouth daily.      cholecalciferol, vitamin D3, 100 mcg (4,000 unit) tablet Take 4,000 Units by mouth daily.      EPINEPHrine (EPIPEN) 0.3 mg/0.3 mL injection syringe Inject into the thigh as needed.      multivitamin tablet Take by mouth daily.      tamsulosin (FLOMAX) 0.4 mg capsule Take 0.4 mg by mouth once daily.      triamcinolone (KENALOG) 0.1 % cream apply thin layer twice a day to affected area OF body NOT FACE if needed for FLARES      valsartan (DIOVAN) 160 mg tablet Take 1 tablet (160 mg total) by mouth daily. 90 tablet 3    WIXELA INHUB 500-50 mcg/dose diskus inhaler Inhale 2 puffs daily.       No current facility-administered medications for this visit.     Social History     Socioeconomic History    Marital status:    Tobacco Use    Smoking status: Former     Packs/day: 0.25     Years: 12.00     Additional pack years: 0.00     Total pack years: 3.00     Types: Cigarettes     Quit date: 2021     Years since quittin.5    Smokeless tobacco: Never   Vaping Use    Vaping Use: Never used   Substance and Sexual Activity    Alcohol use: Yes     Alcohol/week: 14.0 standard drinks of alcohol     Types: 14 Glasses of wine per week    Drug use: Defer    Sexual activity: Defer     Family History   Problem Relation Age of Onset    Kidney disease Biological Mother     Alzheimer's disease Biological Mother     Hypertension Biological Father     Diabetes Biological Father         Current Outpatient Medications:     albuterol HFA 90 mcg/actuation inhaler, Inhale 2 puffs every 6 (six) hours as needed for wheezing., Disp: , Rfl:     atorvastatin (LIPITOR) 20 mg tablet, Take 40 mg by mouth once daily., Disp: , Rfl:     cetirizine (ZyrTEC) 10 mg  tablet, Take 10 mg by mouth daily., Disp: , Rfl:     cholecalciferol, vitamin D3, 100 mcg (4,000 unit) tablet, Take 4,000 Units by mouth daily., Disp: , Rfl:     EPINEPHrine (EPIPEN) 0.3 mg/0.3 mL injection syringe, Inject into the thigh as needed., Disp: , Rfl:     multivitamin tablet, Take by mouth daily., Disp: , Rfl:     tamsulosin (FLOMAX) 0.4 mg capsule, Take 0.4 mg by mouth once daily., Disp: , Rfl:     triamcinolone (KENALOG) 0.1 % cream, apply thin layer twice a day to affected area OF body NOT FACE if needed for FLARES, Disp: , Rfl:     valsartan (DIOVAN) 160 mg tablet, Take 1 tablet (160 mg total) by mouth daily., Disp: 90 tablet, Rfl: 3    WIXELA INHUB 500-50 mcg/dose diskus inhaler, Inhale 2 puffs daily., Disp: , Rfl:     Review of Systems   Constitutional: Negative.   HENT: Negative.     Cardiovascular:  Positive for dyspnea on exertion (mild). Negative for chest pain, irregular heartbeat, leg swelling, near-syncope, orthopnea, palpitations, paroxysmal nocturnal dyspnea and syncope.   Respiratory: Negative.     Endocrine: Negative.    Skin: Negative.    Musculoskeletal: Negative.    Gastrointestinal: Negative.    Genitourinary: Negative.    Neurological: Negative.    Psychiatric/Behavioral: Negative.        Objective    Vitals:    06/03/24 1133   BP: 122/81   Pulse: 63   Resp: 16   Temp: 36.4 °C (97.6 °F)   SpO2: 96%      Physical Exam  Constitutional:       Appearance: Normal appearance. He is normal weight.   HENT:      Head: Normocephalic.      Right Ear: Tympanic membrane normal.      Nose: Nose normal.      Mouth/Throat:      Mouth: Mucous membranes are moist.   Eyes:      Extraocular Movements: Extraocular movements intact.   Cardiovascular:      Rate and Rhythm: Normal rate and regular rhythm.      Heart sounds: Murmur heard.   Pulmonary:      Effort: Pulmonary effort is normal. No respiratory distress.      Breath sounds: No wheezing or rales.   Abdominal:      Palpations: Abdomen is soft.    Musculoskeletal:         General: Normal range of motion.      Cervical back: Normal range of motion.      Right lower leg: No edema.      Left lower leg: No edema.   Skin:     General: Skin is warm.   Neurological:      General: No focal deficit present.      Mental Status: He is alert and oriented to person, place, and time. Mental status is at baseline.   Psychiatric:         Mood and Affect: Mood normal.         Behavior: Behavior normal.         Thought Content: Thought content normal.         Judgment: Judgment normal.          Labs  CMP Results         05/21/24 05/08/23 04/15/22     1110 [id number]    NA -- -- 136    K -- -- 4.8    Cl -- -- 100    CO2 -- -- 20    Glucose -- -- 109    BUN 17 15 22    Creatinine 0.78 1.02 0.99    EGFR 94 78 81             Imaging    CTA 6/3/2024    CLINICAL HISTORY: I71.9: Aortic aneurysm of unspecified site, without rupture     COMMENT:  COMPARISON: None.     TECHNIQUE:  CTA of the chest was performed with and without intravenous  contrast. Coronal and sagittal reconstructed images were obtained.     ADMINISTERED IV CONTRAST AND DOSE: 100mL of iopamidoL (ISOVUE-370) 370 mg iodine  /mL (76 %) injection 100 mL     3D MIP and/or volume rendered reconstructions performed and reviewed.     CT DOSE:  One or more dose reduction techniques (e.g. automated exposure  control, adjustment of the mA and/or kV according to patient size, use of  iterative reconstruction technique) utilized for this examination.     FINDINGS:     Aorta:  Ascending aorta is at the upper limits of normal, measures 3.9 cm in diameter at  the level of right pulmonary artery.     Aortic Annulus =  28 mm (normal 20-31 mm)  Sinus of Valsalva =  43 mm (normal 29-45 mm)  Sinotubular junction =  34 mm (normal 22-36 mm)  (Mid) Ascending thoracic aorta, at the right pulmonary artery level = 39 mm  Thoracic Aortic Arch, at the level of the left subclavian artery = 28 mm  Descending thoracic aorta, at the right pulmonary  artery level = 26 mm     Lines/tubes: None.     Lower neck:  Limited thyroid: Normal.  Limited supraclavicular region: Normal.     Lungs, airways and pleura:  Pleura: No pleural effusion.  Lungs: Normal.  Airways: Normal.     Mediastinum:  Heart: Normal in size.  Severe coronary artery calcification.  Pericardium: Trace pericardial effusion is noted.  Hilar and mediastinal lymph node stations: A 1.7 cm subcarinal lymph node is  noted.  Esophagus: Moderate hiatal hernia is seen.  Vessels: Unremarkable.     Axilla: A few mildly prominent axillary lymph nodes are noted, with the largest  one measures 2.7 x 1.9 cm.     Limited visualized upper abdomen: Unremarkable.     Bones and soft tissues:  Soft tissues: Unremarkable.  Bones: Unremarkable.     --  IMPRESSION:     No evidence of thoracic aortic aneurysm or dissection. Ascending aorta is at the  upper limits of normal, measures 3.9 cm in diameter at the level of right  pulmonary artery.     A few mildly prominent axillary and subcarinal lymph nodes are noted, may  represent reactive lymph nodes. Attention on follow-up is recommended.     In accordance with [place] Act [place],  the patient will receive a letter notifying them  to follow up with their physician.    TTE 5/16/2024:    The left ventricular cavity size is normal with normal wall thickness and preserved systolic function. Estimated EF 60-65%.  Beemer not well-seen but grossly normal wall motion. Indeterminate diastolic function.     The aortic valve is likely tricuspid. No stenosis. Mild to moderate eccentric aortic regurgitation directed towards anterior mitral leaflet that occurs at the coaptation of the left and right coronary cusps.     The aortic root is dilated and measures 4.2 cm in diameter at the sinuses of valsalva. The visualized portion of the ascending aorta measures 4.1 cm in diameter.  In visualized portion of aorta no dissection flap noted.     The mitral valve is mildly thickened. Mild mitral annular  calcification. No regurgitation.      The left atrium is normal in size.      The right ventricular cavity is normal with preserved systolic function.      The pulmonic valve not well seen. There is trace pulmonic regurgitation.     The tricuspid valve is normal. Trace regurgitation with insufficient regurgitant jet to estimate RVSP.       Right atrium is normal in size.     The IVC is not well seen.     Trace pericardial effusion.      Compared to previous echocardiogram from 11/29/23, there are no significant changes.           Assessment/Plan          Mr Sahni is a 73 y.o. male here for follow up of a Dilated ascending aorta as well as aortic insufficiency.  He established care with Dr Forrester in 2022.  He was noted on TTE to have mild AI and a normal EF.      Imaging performed on 6/3/2024 was reviewed today utilizing the [employer] 3D system. There is no evidence of dissection. He has a tricuspid aortic valve.  His ascending aorta at the level of the pulmonary artery in the axial view measures 38 mm.  His aortic root at the SOV measures 42 mm in the coronal plane. He has a tricuspid valve on CTA. The patient continues to remain stable and performs normal daily activities without complaint.  He is not currently experiencing any cardiac symptoms. He has baseline mild SOB on exertion which has not changed.    Most recent TTE completed on 5/16/2024 demonstrated and EF of 60-65%, normal LV size, no AS, mild to moderate AI ( was mild in 2023).    He was made aware of the incidental finding on CTA A few mildly prominent axillary and subcarinal lymph nodes are noted, may represent reactive lymph nodes. Attention on follow-up is recommended. I will route this report to his PCP, and he was recommended to call to follow up.     I would like to see him in 18 months with a repeat CTA chest.  He was advised to contact us in the meantime for any questions or concerns.  I have asked him to continue following regularly with   Prior for his cardiovascular care and yearly TTE's to monitor his AI.    Based on STS guidelines discussed below patient is not yet at trigger for surgical intervention.      Aorta/Valve Guidelines  Data   Asc. (mm)   38 Root (mm)  42  Past asc. (mm)  38  Height    Cm  178   Weight   Kg  78 BSA   Body surface area is 1.96 meters squared.   Calculations   Ratio Surface/Height 7.78   Aortic Size Index (Size/Body Surface Area) 2.14   2010 ACC/AHA/AATS Guidelines on aortic disease:    2014 ACC/AHA guidelines for valvular disease:   2014 CCS guidelines for thoracic aortic disease:    2014 ESC guidelines for thoracic aortic disease: No recommendation     No recommendation    No recommendation       Lower threshold for intervention may be considered in patients of small stature, rapid growth, patient preference, aortic valve regurgitation or planned pregnancy       Surgical intervention is recommended when the risk of rupture and/or dissection is greater that the natural history of the disease.      The surgical triggers for an ascending aortic aneurysm include:  1. >4.5 cm in the setting of a family history positive for rupture or dissection  2. >5.0cm for Marfan's with a negative family history of rupture/dissection  3. >5.0 cm in the setting of a bicuspid aortic valve and/or moderate or worse aortic valve disease with increased left ventricular diameters and decreased left ventricular function  4. >5.5 cm regardless of aortic valve function and/or acute or chronic dissection  5. Growth of greater than 3mm in one year.  6. Primary valve failure    *In some cases, given the surgical risk, the patient may be allowed to reach a larger aortic diameter prior to surgical intervention.     Valvular indications for surgery:   1. Development of severe AI   2. LVEF less than 50%   3. LVEDD> 6.0cm or LVESD>4.2cm   4. Development of Class II or worse CHF     Recommendations for management of aortic disease were reviewed in detail  today and include:  All patients with aortic disease will require lifelong screening and surveillance in a dedicated aortic clinic.   Maintaining good blood pressure control <130/80 mmHg is crucial to managing aortic disease.  Patient was advised to follow regularly with their cardiologist/PCP  Exercise is important! Moderate intensity exercise is recommended and has great benefit on blood pressure control. Avoid grunt-level lifting, including isometric exercise, not to exceed 40lbs.  Heavy lifting or straining causes brief spikes in blood pressure, which we are hoping to avoid.    Avoid fluoroquinolone antibiotics given associated increased risk of aortic aneurysm and dissection in high risk patients.   Avoid all tobacco use, including cigarette smoking.  Avoid illicit drugs (ie cocaine, etc).  Screening for aortic disease is recommended in all first order relatives (parents, siblings, children) with echocardiogram, CT or MRI which should be coordinated through their medical providers.   In the unlikely event of sudden onset of chest pain or pain that radiates to the upper back, they were advised to report to the closest ED and have our service notified.       JARON Lott       Thank you for your recommendation to the aortic clinic and continued care of this patient.  All questions reviewed and answered to patient's satisfaction.  he will continue to follow locally with her PCP and cardiologist for ongoing medical care and was encouraged to call the office in the event he has any questions or concerns.     JARON Lott 6/3/2024  11:44 AM

## 2024-06-03 NOTE — PATIENT INSTRUCTIONS
CTA in 18 months and an office visit in aortic clinic      Recommendations for management of aortic disease were reviewed in detail today and include:  All patients with aortic disease will require lifelong screening and surveillance in a dedicated aortic clinic.   Maintaining good blood pressure control <130/80 mmHg is crucial to managing aortic disease.  Patient was advised to follow regularly with their cardiologist/PCP  Exercise is important! Moderate intensity exercise is recommended and has great benefit on blood pressure control. Avoid grunt-level lifting, including isometric exercise, not to exceed 40lbs.  Heavy lifting or straining causes brief spikes in blood pressure, which we are hoping to avoid.    Avoid fluoroquinolone antibiotics given associated increased risk of aortic aneurysm and dissection in high risk patients.   Avoid all tobacco use, including cigarette smoking.  Avoid illicit drugs (ie cocaine, etc).  Screening for aortic disease is recommended in all first order relatives (parents, siblings, children) with echocardiogram, CT or MRI which should be coordinated through their medical providers.   In the unlikely event of sudden onset of chest pain or pain that radiates to the upper back, they were advised to report to the closest ED and have our service notified.

## 2024-06-03 NOTE — PROGRESS NOTES
"Dieter Sahni   [id number]    Your doctor has referred you for a CT examination that requires the injection of an iodinated contrast material into your bloodstream. This iodinated contrast material, sometimes referred to as \"x-ray dye\" allows for better interpretation of the x-ray films or CT images and results in a more accurate interpretation of the examination.     Without the use of iodinated contrast (x-ray dye), the examination may be less informative and result in a suboptimal examination, and possibly a delay in diagnosis and, if needed, treatment.     The iodinated contrast material is given through a small needle or catheter placed into a vein, usually on the inside of the elbow, on the back of hand, or in a vein in the foot or lower leg.    The most common, though still rare, potential reaction to an intravenous contrast injection is an allergic-like reaction. Most allergic-like reactions are mild, though a small subset of people can have more severe reactions. Mild reactions include mild / scattered hives, itching, scratchy throat, sneezing and nasal congestion. More severe reactions include facial swelling, severe difficulty breathing, wheezing and anaphylactic shock. Those with a prior history allergic-like reaction to the same class of contrast media (such as CT contrast or MRI contrast) are at the highest risk for an allergic reaction.     If you believe you had an allergic reaction to contrast in the past, please let our staff know. We can determine if this increases your risk for a future reaction and provide steps to decrease the risk. This may delay your examination, but it decreases the risk of having a new and possibly more severe reaction to the contrast injection.    People with a history of prior allergic reactions to other substances (such as unrelated medications and food) and patients with a history of asthma have slightly increased risk for an allergic reaction from contrast " material when compared with the general population, but do not require to be pretreated prior to a contrast injection.    You should notify the physician, nurse or technologist if you have ever had any of these conditions or if you have any questions.

## 2024-06-06 ENCOUNTER — TELEPHONE (OUTPATIENT)
Dept: SCHEDULING | Facility: CLINIC | Age: 74
End: 2024-06-06
Payer: COMMERCIAL

## 2024-12-04 ENCOUNTER — TELEPHONE (OUTPATIENT)
Dept: SCHEDULING | Facility: CLINIC | Age: 74
End: 2024-12-04

## 2024-12-12 ENCOUNTER — OFFICE VISIT (OUTPATIENT)
Dept: CARDIOLOGY | Facility: CLINIC | Age: 74
End: 2024-12-12
Payer: COMMERCIAL

## 2024-12-12 VITALS
SYSTOLIC BLOOD PRESSURE: 118 MMHG | RESPIRATION RATE: 16 BRPM | HEART RATE: 60 BPM | BODY MASS INDEX: 24.62 KG/M2 | WEIGHT: 172 LBS | HEIGHT: 70 IN | DIASTOLIC BLOOD PRESSURE: 55 MMHG

## 2024-12-12 DIAGNOSIS — I35.1 NONRHEUMATIC AORTIC VALVE INSUFFICIENCY: Primary | ICD-10-CM

## 2024-12-12 LAB
ATRIAL RATE: 61
P AXIS: 19
PR INTERVAL: 150
QRS DURATION: 88
QT INTERVAL: 386
QTC CALCULATION(BAZETT): 388
R AXIS: 1
T WAVE AXIS: 30
VENTRICULAR RATE: 61

## 2024-12-12 PROCEDURE — 3008F BODY MASS INDEX DOCD: CPT | Performed by: STUDENT IN AN ORGANIZED HEALTH CARE EDUCATION/TRAINING PROGRAM

## 2024-12-12 PROCEDURE — 3074F SYST BP LT 130 MM HG: CPT | Performed by: STUDENT IN AN ORGANIZED HEALTH CARE EDUCATION/TRAINING PROGRAM

## 2024-12-12 PROCEDURE — 93000 ELECTROCARDIOGRAM COMPLETE: CPT | Performed by: STUDENT IN AN ORGANIZED HEALTH CARE EDUCATION/TRAINING PROGRAM

## 2024-12-12 PROCEDURE — 3078F DIAST BP <80 MM HG: CPT | Performed by: STUDENT IN AN ORGANIZED HEALTH CARE EDUCATION/TRAINING PROGRAM

## 2024-12-12 PROCEDURE — 99214 OFFICE O/P EST MOD 30 MIN: CPT | Performed by: STUDENT IN AN ORGANIZED HEALTH CARE EDUCATION/TRAINING PROGRAM

## 2024-12-12 RX ORDER — ATORVASTATIN CALCIUM 40 MG/1
40 TABLET, FILM COATED ORAL
Qty: 90 TABLET | Refills: 3 | Status: SHIPPED | OUTPATIENT
Start: 2024-12-12 | End: 2025-01-14 | Stop reason: SDUPTHER

## 2024-12-12 NOTE — PROGRESS NOTES
Vu Muñoz MD  Cardiology    [facility]    [facility]  [facility] [address]  [address] [place] [address]    TEL  [phone]  [email or web address]     12/12/24     It was my pleasure to see Dieter Sahni at the [facility] today.     Dieter Sahni is a 74 y.o. male with a history of hypertension, top normal thoracic aorta, hyperlipidemia, and BPH who presents for follow-up.    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 He has been exercising - push up, pull ups, sit ups. Remainder of cardiovascular review of systems was reported normal.     BP at home has been good but has bee a few weeks.       ECG from today personally reviewed and discussed with the patient shows sinus rhythm with a PAC.    Assessment/Plan     Top normal ascending aorta size  The patient has undergone CT angiography in 9/20/2020, 4/2022, 5/2023, all of which have demonstrated no evidence of a significant ascending aortic aneurysm.  The greatest dimension that is noted on the report from the outside hospital is a diameter of 3.8 cm.  His transthoracic echocardiogram from 11/2023 measured a maximal diameter of 4 cm.     Aorta diameter to height ratio of 2.2 cm/m. Patient height ratio of <=2.43 cm/m indicates lower risk, 2.44-3.17 cm/m indicates moderate risk warranting close radiographic follow-up, 3.21-4.06 cm/m indicates high risk, and >=4.1 cm/m represents severe risk.     - Continue Valsartan  - Recommend stopping year CT angiography given that his aorta has been stable in size and is not aneurysmal.    Hypertension  Blood pressure is well-controlled in the office today.  He is currently on valsartan 160 mg daily    Hyperlipidemia  High calcific plaque burden in the coronary arteries  He has extensive coronary artery calcifications throughout all 3 major vessels reviewed on CT scan with patient today.  Given this I feel that  he would benefit from treating his coronary disease as secondary prevention. Secondary prevention goals: LDL <55, Non-hdl cholesterol - <85, Apo B < 65    He is currently without symptoms consistent with angina. Most recent lipid panel with total cholesterol 162, triglycerides 92, HDL 60, LDL 85 and a non-HDL cholesterol of 102.    - We will increase atorvastatin to 40 mg        It was my pleasure to visit with Dieter Sahni in clinic today.  Please do not hesitate to contact me with any questions.      Sincerely,    ________________  Vu Muñoz MD          Social History:  Social History     Tobacco Use    Smoking status: Former     Current packs/day: 0.00     Average packs/day: 0.3 packs/day for 12.0 years (3.0 ttl pk-yrs)     Types: Cigarettes     Start date: 11/2009     Quit date: 11/2021     Years since quitting: 3.1    Smokeless tobacco: Never   Vaping Use    Vaping status: Never Used   Substance Use Topics    Alcohol use: Yes     Alcohol/week: 14.0 standard drinks of alcohol     Types: 14 Glasses of wine per week    Drug use: Defer       Family History:    Family History   Problem Relation Name Age of Onset    Kidney disease Biological Mother      Alzheimer's disease Biological Mother      Hypertension Biological Father      Diabetes Biological Father           Review of Systems: A complete 14-point review of systems is negative, except as noted in the HPI.    Exam:  Objective   There were no vitals filed for this visit.    There is no height or weight on file to calculate BMI.  Wt Readings from Last 3 Encounters:   06/03/24 77.8 kg (171 lb 9.6 oz)   05/16/24 77.1 kg (170 lb)   11/30/23 77.5 kg (170 lb 12.8 oz)       Physical Exam  Constitutional:       Appearance: He is well-developed.   HENT:      Head: Atraumatic.   Eyes:      General: No scleral icterus.  Neck:      Vascular: No JVD.      Trachea: No tracheal deviation.   Cardiovascular:      Rate and Rhythm: Normal rate and regular rhythm.       Heart sounds: Normal heart sounds. No murmur heard.     No friction rub.   Pulmonary:      Effort: Pulmonary effort is normal. No respiratory distress.      Breath sounds: Normal breath sounds. No wheezing or rales.   Abdominal:      Palpations: Abdomen is soft.      Tenderness: There is no abdominal tenderness. There is no guarding.   Skin:     General: Skin is warm and dry.   Neurological:      Mental Status: He is alert.          Labs: Personally reviewed and discussed with the patient.  Notable for the following.   Lab Results   Component Value Date     04/15/2022    K 4.8 04/15/2022    BUN 17 05/21/2024    CREATININE 0.78 05/21/2024

## 2025-01-14 RX ORDER — ATORVASTATIN CALCIUM 40 MG/1
40 TABLET, FILM COATED ORAL
Qty: 90 TABLET | Refills: 3 | Status: SHIPPED | OUTPATIENT
Start: 2025-01-14 | End: 2025-01-17 | Stop reason: DRUGHIGH

## 2025-01-14 NOTE — TELEPHONE ENCOUNTER
"Medicine Refill Request  Pt states his dosage increased to 80 mg please advise      atorvastatin (LIPITOR) 40 mg tablet,      Current Outpatient Medications:     albuterol HFA 90 mcg/actuation inhaler, Inhale 2 puffs every 6 (six) hours as needed for wheezing., Disp: , Rfl:     atorvastatin (LIPITOR) 40 mg tablet, Take 1 tablet (40 mg total) by mouth once daily., Disp: 90 tablet, Rfl: 3    cetirizine (ZyrTEC) 10 mg tablet, Take 10 mg by mouth daily., Disp: , Rfl:     cholecalciferol, vitamin D3, 100 mcg (4,000 unit) tablet, Take 4,000 Units by mouth daily., Disp: , Rfl:     EPINEPHrine (EPIPEN) 0.3 mg/0.3 mL injection syringe, Inject into the thigh as needed., Disp: , Rfl:     multivitamin tablet, Take by mouth daily., Disp: , Rfl:     triamcinolone (KENALOG) 0.1 % cream, apply thin layer twice a day to affected area OF body NOT FACE if needed for FLARES, Disp: , Rfl:     valsartan (DIOVAN) 160 mg tablet, take 1 tablet by mouth once daily, Disp: 90 tablet, Rfl: 3    WIXELA INHUB 500-50 mcg/dose diskus inhaler, Inhale 2 puffs daily., Disp: , Rfl:       BP Readings from Last 3 Encounters:   12/12/24 (!) 118/55   06/03/24 122/81   05/16/24 122/85       Recent Lab results:  No results found for: \"CHOL\", No results found for: \"HDL\", No results found for: \"LDLCALC\", No results found for: \"TRIG\"     Lab Results   Component Value Date    GLUCOSE 109 (H) 04/15/2022   , No results found for: \"HGBA1C\"      Lab Results   Component Value Date    CREATININE 0.78 05/21/2024       No results found for: \"TSH\"      No results found for: \"HGBA1C\"  "

## 2025-01-17 ENCOUNTER — TELEPHONE (OUTPATIENT)
Dept: SCHEDULING | Facility: CLINIC | Age: 75
End: 2025-01-17
Payer: COMMERCIAL

## 2025-01-17 ENCOUNTER — TELEPHONE (OUTPATIENT)
Dept: CARDIOLOGY | Facility: CLINIC | Age: 75
End: 2025-01-17
Payer: COMMERCIAL

## 2025-01-17 DIAGNOSIS — E78.5 HYPERLIPIDEMIA, UNSPECIFIED HYPERLIPIDEMIA TYPE: Primary | ICD-10-CM

## 2025-01-17 RX ORDER — ATORVASTATIN CALCIUM 80 MG/1
80 TABLET, FILM COATED ORAL DAILY
Qty: 90 TABLET | Refills: 3 | Status: SHIPPED | OUTPATIENT
Start: 2025-01-17 | End: 2026-01-12

## 2025-01-17 NOTE — TELEPHONE ENCOUNTER
Spoke w/ patient.    States Atorvastatin dose already handled and sent ( BS sent).    Advised to repeat fasting Lipids in 3 months and will mail him lab slip.    [facility] Team: Please mail copy of repeat Lipid panel.    Thank you

## 2025-01-17 NOTE — TELEPHONE ENCOUNTER
Medication Clarification     Name of caller: Dieter Sahni    Relationship to patient: self    Name of patient: Dieter Sahni    Name of physician: Vu Muñoz MD    Name of medication: atorvastatin (LIPITOR) 40 mg tablet     What needs to be clarified: wanted to ensure his atorvastatin was increased as he cannot recall original dosage.    Best contact number: [phone]

## 2025-01-17 NOTE — TELEPHONE ENCOUNTER
Please see Atorvastatin dose increase clarification.    Last OV 12/12 rec'd to increase Atorvastatin 40 mg daily.  11/2023 OV notes pt on Atorvastatin 20 mg daily.    But, when I open old Rx for Atorvastatin it looks like PCP had been prescribing Atorvastatin 40 mg 1 tablet at least since 2/2024.  Patient unaware of dose he was previously taking.    Agreeable to further increase Atorvastatin since looks like already on Atorvastatin 40 mg daily?  Or wish to try 40 mg daily and repeat lipids?    Thank you

## 2025-01-17 NOTE — TELEPHONE ENCOUNTER
Patient stopped in office and wanted to clarify his atorvastatin dosage should be 80 mg daily.  I sent a new prescription for this medication.

## 2025-02-05 LAB
CHOLEST SERPL-MCNC: 126 MG/DL (ref 100–199)
HDLC SERPL-MCNC: 50 MG/DL
LDLC SERPL CALC-MCNC: 64 MG/DL (ref 0–99)
TRIGL SERPL-MCNC: 56 MG/DL (ref 0–149)
VLDLC SERPL CALC-MCNC: 12 MG/DL (ref 5–40)

## 2025-02-06 RX ORDER — EZETIMIBE 10 MG/1
10 TABLET ORAL NIGHTLY
Qty: 90 TABLET | Refills: 1 | Status: SHIPPED | OUTPATIENT
Start: 2025-02-06 | End: 2025-08-05

## 2025-07-15 ENCOUNTER — TELEPHONE (OUTPATIENT)
Dept: CARDIOLOGY | Facility: CLINIC | Age: 75
End: 2025-07-15
Payer: COMMERCIAL

## 2025-07-15 NOTE — TELEPHONE ENCOUNTER
Spoke with Augusto from pt PCP office     Requested referral for upcoming visit 07/17     He states he will have it done by end of the day.

## 2025-07-17 ENCOUNTER — OFFICE VISIT (OUTPATIENT)
Dept: CARDIOLOGY | Facility: CLINIC | Age: 75
End: 2025-07-17
Payer: COMMERCIAL

## 2025-07-17 VITALS
DIASTOLIC BLOOD PRESSURE: 86 MMHG | HEIGHT: 70 IN | RESPIRATION RATE: 16 BRPM | WEIGHT: 173 LBS | SYSTOLIC BLOOD PRESSURE: 130 MMHG | BODY MASS INDEX: 24.77 KG/M2 | HEART RATE: 62 BPM

## 2025-07-17 DIAGNOSIS — I10 HYPERTENSION, UNSPECIFIED TYPE: Primary | ICD-10-CM

## 2025-07-17 LAB
ATRIAL RATE: 62
P AXIS: 23
PR INTERVAL: 150
QRS DURATION: 92
QT INTERVAL: 398
QTC CALCULATION(BAZETT): 403
R AXIS: 1
T WAVE AXIS: 22
VENTRICULAR RATE: 62

## 2025-07-17 PROCEDURE — 3075F SYST BP GE 130 - 139MM HG: CPT | Performed by: STUDENT IN AN ORGANIZED HEALTH CARE EDUCATION/TRAINING PROGRAM

## 2025-07-17 PROCEDURE — 93000 ELECTROCARDIOGRAM COMPLETE: CPT | Performed by: STUDENT IN AN ORGANIZED HEALTH CARE EDUCATION/TRAINING PROGRAM

## 2025-07-17 PROCEDURE — 3008F BODY MASS INDEX DOCD: CPT | Performed by: STUDENT IN AN ORGANIZED HEALTH CARE EDUCATION/TRAINING PROGRAM

## 2025-07-17 PROCEDURE — 3079F DIAST BP 80-89 MM HG: CPT | Performed by: STUDENT IN AN ORGANIZED HEALTH CARE EDUCATION/TRAINING PROGRAM

## 2025-07-17 PROCEDURE — 99214 OFFICE O/P EST MOD 30 MIN: CPT | Performed by: STUDENT IN AN ORGANIZED HEALTH CARE EDUCATION/TRAINING PROGRAM

## 2025-07-17 RX ORDER — TAMSULOSIN HYDROCHLORIDE 0.4 MG/1
0.4 CAPSULE ORAL DAILY
COMMUNITY
Start: 2025-05-16

## 2025-07-17 NOTE — PROGRESS NOTES
Vu Muñoz MD  Cardiology    [facility]    [facility] [address]  [address] [place] [address]    TEL  [phone]  [email or web address]     07/17/25     It was my pleasure to see Dieter Sahni at the [facility] today.     Dieter Sahni is a 75 y.o. male with a history of hypertension, top normal thoracic aorta, hyperlipidemia, and BPH who presents for follow-up.    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 Working out twice a week. Some shortness of breat at times. Recovery takes a little longer than it had. Some SOB walking which seems to have developed over the last month. However, when at the gym, he feels great. Remainder of cardiovascular review of systems was reported normal.     BP at home is usually, 120/75.       ECG from today personally reviewed and discussed with the patient shows sinus rhythm      Assessment/Plan     Top normal ascending aorta size  The patient has undergone CT angiography in 9/20/2020, 4/2022, 5/2023, all of which have demonstrated no evidence of a significant ascending aortic aneurysm.  The greatest dimension that is noted on the report from the outside hospital is a diameter of 3.8 cm.  His transthoracic echocardiogram from 11/2023 measured a maximal diameter of 4 cm.     Aorta diameter to height ratio of 2.2 cm/m. Patient height ratio of <=2.43 cm/m indicates lower risk, 2.44-3.17 cm/m indicates moderate risk warranting close radiographic follow-up, 3.21-4.06 cm/m indicates high risk, and >=4.1 cm/m represents severe risk.     - Continue Valsartan  - Recommend stopping year CT angiography given that his aorta has been stable in size and is not aneurysmal.    Hypertension  Blood pressure is well-controlled in the office today.  He is currently on valsartan 160 mg daily    Hyperlipidemia  High calcific plaque burden in the coronary arteries  He has  extensive coronary artery calcifications throughout all 3 major vessels reviewed on CT scan with patient today.  Given this I feel that he would benefit from treating his coronary disease as secondary prevention. Secondary prevention goals: LDL <55, Non-hdl cholesterol - <85, Apo B < 65     Latest Reference Range & Units 02/04/25 10:47   Cholesterol 100 - 199 mg/dL 126   Triglycerides 0 - 149 mg/dL 56   HDL >39 mg/dL 50   LDL Calculated 0 - 99 mg/dL 64   Repeat 6/2025 - , TG 53, HL 55 LDL 41.   - We will continue atorvastatin to 40 mg and ezetimibe.         It was my pleasure to visit with Dieter Sahni in clinic today.  Please do not hesitate to contact me with any questions.      Sincerely,    ________________  Vu Muñoz MD      I attest that this visit supports the complexity inherent to evaluation and management associated with medical care services that serve as the continuing focal point for all needed health care services and/or medical care services that are part of ongoing care related to this patient's single, serious condition or a complex condition.    This visit qualifies for a Level 4 outpatient encounter (CPT 06938) based on moderate complexity medical decision-making.  The patient was seen in follow-up for hypertension, hyperlipidemia, and stable ascending aortic dilation. I reviewed three prior CT angiograms and a transthoracic echocardiogram, confirming stable aortic dimensions with no evidence of aneurysmal progression. The indexed aortic diameter falls in the low-risk category, and a shared decision was made to discontinue routine CT angiography moving forward.  Additionally, the patient has significant coronary artery calcification across all three major vessels. Based on imaging, I classified him as secondary prevention and established aggressive lipid targets. Recent lipid panels were reviewed, and we will continue atorvastatin 40 mg and ezetimibe therapy.  This visit involved  medication management, image review, and longitudinal risk stratification, qualifying as moderate complexity MDM.    Social History:  Social History     Tobacco Use    Smoking status: Former     Current packs/day: 0.00     Average packs/day: 0.3 packs/day for 12.0 years (3.0 ttl pk-yrs)     Types: Cigarettes     Start date: 11/2009     Quit date: 11/2021     Years since quitting: 3.7    Smokeless tobacco: Never   Vaping Use    Vaping status: Never Used   Substance Use Topics    Alcohol use: Yes     Alcohol/week: 14.0 standard drinks of alcohol     Types: 14 Glasses of wine per week    Drug use: Defer       Family History:    Family History   Problem Relation Name Age of Onset    Kidney disease Biological Mother      Alzheimer's disease Biological Mother      Hypertension Biological Father      Diabetes Biological Father           Review of Systems: A complete 14-point review of systems is negative, except as noted in the HPI.    Exam:  Objective   There were no vitals filed for this visit.    There is no height or weight on file to calculate BMI.  Wt Readings from Last 3 Encounters:   12/12/24 78 kg (172 lb)   06/03/24 77.8 kg (171 lb 9.6 oz)   05/16/24 77.1 kg (170 lb)       Physical Exam  Constitutional:       Appearance: He is well-developed.   HENT:      Head: Atraumatic.   Eyes:      General: No scleral icterus.  Neck:      Vascular: No JVD.      Trachea: No tracheal deviation.   Cardiovascular:      Rate and Rhythm: Normal rate and regular rhythm.      Heart sounds: Normal heart sounds. No murmur heard.     No friction rub.   Pulmonary:      Effort: Pulmonary effort is normal. No respiratory distress.      Breath sounds: Normal breath sounds. No wheezing or rales.   Abdominal:      Palpations: Abdomen is soft.      Tenderness: There is no abdominal tenderness. There is no guarding.   Skin:     General: Skin is warm and dry.   Neurological:      Mental Status: He is alert.          Labs: Personally reviewed and  discussed with the patient.  Notable for the following.   Lab Results   Component Value Date    LDLCALC 64 02/04/2025    CHOL 126 02/04/2025    TRIG 56 02/04/2025    HDL 50 02/04/2025     04/15/2022    K 4.8 04/15/2022    BUN 17 05/21/2024    CREATININE 0.78 05/21/2024

## 2025-07-30 DIAGNOSIS — I35.0 NONRHEUMATIC AORTIC VALVE STENOSIS: Primary | ICD-10-CM

## 2025-07-30 RX ORDER — EZETIMIBE 10 MG/1
10 TABLET ORAL NIGHTLY
Qty: 90 TABLET | Refills: 1 | Status: SHIPPED | OUTPATIENT
Start: 2025-07-30